# Patient Record
Sex: MALE | Race: BLACK OR AFRICAN AMERICAN | NOT HISPANIC OR LATINO | Employment: OTHER
[De-identification: names, ages, dates, MRNs, and addresses within clinical notes are randomized per-mention and may not be internally consistent; named-entity substitution may affect disease eponyms.]

---

## 2017-01-05 ENCOUNTER — CHARTING TRANS (OUTPATIENT)
Dept: OTHER | Age: 72
End: 2017-01-05

## 2017-02-15 ENCOUNTER — OFFICE VISIT (OUTPATIENT)
Dept: PODIATRY CLINIC | Facility: CLINIC | Age: 72
End: 2017-02-15

## 2017-02-15 DIAGNOSIS — B35.1 ONYCHOMYCOSIS: ICD-10-CM

## 2017-02-15 DIAGNOSIS — M79.674 PAIN OF TOE OF RIGHT FOOT: Primary | ICD-10-CM

## 2017-02-15 PROCEDURE — 11720 DEBRIDE NAIL 1-5: CPT | Performed by: PODIATRIST

## 2017-02-15 RX ORDER — A/SINGAPORE/GP1908/2015 IVR-180 (H1N1) (AN A/MICHIGAN/45/2015 (H1N1)PDM09-LIKE VIRUS), A/HONG KONG/4801/2014, NYMC X-263B (H3N2) (AN A/HONG KONG/4801/2014-LIKE VIRUS), AND B/BRISBANE/60/2008, WILD TYPE (A B/BRISBANE/60/2008-LIKE VIRUS) 15; 15; 15 UG/.5ML; UG/.5ML; UG/.5ML
INJECTION, SUSPENSION INTRAMUSCULAR
Refills: 0 | COMMUNITY
Start: 2016-12-07 | End: 2021-11-17 | Stop reason: ALTCHOICE

## 2017-02-15 NOTE — PROGRESS NOTES
HPI:    Patient ID: Sofy Cabrera is a 66year old male. HPI  This 58-year-old male present in associated with the nails of his right foot. He makes an effort but struggles to care for them sufficiently to provide relief.   Review of Systems  I did rev redevelop         ASSESSMENT/PLAN:   Pain of toe of right foot  (primary encounter diagnosis)  Onychomycosis    No orders of the defined types were placed in this encounter.        Meds This Visit:  No prescriptions requested or ordered in this encounter

## 2017-05-17 ENCOUNTER — OFFICE VISIT (OUTPATIENT)
Dept: PODIATRY CLINIC | Facility: CLINIC | Age: 72
End: 2017-05-17

## 2017-05-17 DIAGNOSIS — L84 FOOT CALLUS: ICD-10-CM

## 2017-05-17 DIAGNOSIS — M79.672 FOOT PAIN, LEFT: Primary | ICD-10-CM

## 2017-05-17 PROCEDURE — 99213 OFFICE O/P EST LOW 20 MIN: CPT | Performed by: PODIATRIST

## 2017-05-17 PROCEDURE — G0463 HOSPITAL OUTPT CLINIC VISIT: HCPCS | Performed by: PODIATRIST

## 2017-05-17 NOTE — PROGRESS NOTES
HPI:    Patient ID: Hao Hernandez is a 66year old male. HPI  This pleasant 75-year-old male presents to me with a new complaint. He developed left foot pain over the course of the last few weeks.   Pain that he is experiencing is with weightbearing an phalangeal joint. There is palpable discomfort on direct palpation. Sharply debridement demonstrates an area of nucleation. Upon complete debridement it is not open or draining.   I am not certain that there is a prominence of this metatarsal phalangeal

## 2017-07-31 ENCOUNTER — OFFICE VISIT (OUTPATIENT)
Dept: PODIATRY CLINIC | Facility: CLINIC | Age: 72
End: 2017-07-31

## 2017-07-31 DIAGNOSIS — M79.674 PAIN OF TOE OF RIGHT FOOT: Primary | ICD-10-CM

## 2017-07-31 DIAGNOSIS — B35.1 ONYCHOMYCOSIS: ICD-10-CM

## 2017-07-31 PROCEDURE — 11720 DEBRIDE NAIL 1-5: CPT | Performed by: PODIATRIST

## 2017-07-31 NOTE — PROGRESS NOTES
HPI:    Patient ID: Ceasar Kimble is a 66year old male. HPI  This 25-year-old male presents with recurrent pain associated with the nails of his right foot. 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   Patient has had previous ASSESSMENT/PLAN:   Pain of toe of right foot  (primary encounter diagnosis)  Onychomycosis    No orders of the defined types were placed in this encounter.       Meds This Visit:  No prescriptions requested or ordered in this encounter    Imaging & Referr

## 2017-09-27 ENCOUNTER — OFFICE VISIT (OUTPATIENT)
Dept: PODIATRY CLINIC | Facility: CLINIC | Age: 72
End: 2017-09-27

## 2017-09-27 DIAGNOSIS — M79.675 TOE PAIN, LEFT: ICD-10-CM

## 2017-09-27 DIAGNOSIS — M79.674 PAIN OF TOE OF RIGHT FOOT: Primary | ICD-10-CM

## 2017-09-27 DIAGNOSIS — B35.1 DERMATOPHYTOSIS OF NAIL: ICD-10-CM

## 2017-09-27 PROCEDURE — 11721 DEBRIDE NAIL 6 OR MORE: CPT | Performed by: PODIATRIST

## 2017-09-27 NOTE — PROGRESS NOTES
HPI:    Patient ID: Dank Garcia is a 66year old male. HPI  This 49-year-old male presents with recurrent pain associated with his nails. He is aware of thickness dystrophy and change consistent with long-standing chronic mycosis.   Patient's had pre symptoms redevelop         ASSESSMENT/PLAN:   Pain of toe of right foot  (primary encounter diagnosis)  Toe pain, left  Dermatophytosis of nail    No orders of the defined types were placed in this encounter.       Meds This Visit:  No prescriptions request

## 2017-11-08 ENCOUNTER — OFFICE VISIT (OUTPATIENT)
Dept: PODIATRY CLINIC | Facility: CLINIC | Age: 72
End: 2017-11-08

## 2017-11-08 DIAGNOSIS — B35.1 ONYCHOMYCOSIS: ICD-10-CM

## 2017-11-08 DIAGNOSIS — M79.674 PAIN OF TOE OF RIGHT FOOT: Primary | ICD-10-CM

## 2017-11-08 DIAGNOSIS — M79.675 TOE PAIN, LEFT: ICD-10-CM

## 2017-11-08 PROCEDURE — 11721 DEBRIDE NAIL 6 OR MORE: CPT | Performed by: PODIATRIST

## 2017-11-08 NOTE — PROGRESS NOTES
HPI:    Patient ID: Louann Valverde is a 74year old male. HPI  This 58-year-old male presents with pain associated with his nails. Patient has report of relief of pain on periodic debridement.   Review of Systems  I did review medical status, medication diagnosis)  Toe pain, left  Onychomycosis    No orders of the defined types were placed in this encounter.       Meds This Visit:  No prescriptions requested or ordered in this encounter    Imaging & Referrals:  None       [id number]

## 2017-12-20 ENCOUNTER — OFFICE VISIT (OUTPATIENT)
Dept: PODIATRY CLINIC | Facility: CLINIC | Age: 72
End: 2017-12-20

## 2017-12-20 DIAGNOSIS — B35.1 ONYCHOMYCOSIS: ICD-10-CM

## 2017-12-20 DIAGNOSIS — M79.674 PAIN OF TOE OF RIGHT FOOT: ICD-10-CM

## 2017-12-20 DIAGNOSIS — M79.675 TOE PAIN, LEFT: Primary | ICD-10-CM

## 2017-12-20 PROCEDURE — 11721 DEBRIDE NAIL 6 OR MORE: CPT | Performed by: PODIATRIST

## 2017-12-20 NOTE — PROGRESS NOTES
HPI:    Patient ID: Jose Engle is a 74year old male. HPI  26-year-old male presents with continued frustration and pain associated with his nails. He reports relief by periodic debridement.   Review of Systems  I reviewed medical status medications foot  Onychomycosis    No orders of the defined types were placed in this encounter.       Meds This Visit:  No prescriptions requested or ordered in this encounter    Imaging & Referrals:  None       [id number]

## 2017-12-27 ENCOUNTER — CHARTING TRANS (OUTPATIENT)
Dept: OTHER | Age: 72
End: 2017-12-27

## 2017-12-27 ENCOUNTER — LAB SERVICES (OUTPATIENT)
Dept: OTHER | Age: 72
End: 2017-12-27

## 2017-12-27 LAB
ALBUMIN SERPL-MCNC: 4.2 G/DL (ref 3.6–5.1)
ALBUMIN/GLOB SERPL: 1.3 (ref 1–2.4)
ALP SERPL-CCNC: 64 UNITS/L (ref 45–117)
ALT SERPL-CCNC: 30 UNITS/L
ANION GAP SERPL CALC-SCNC: 16 MMOL/L (ref 10–20)
APPEARANCE UR: CLEAR
AST SERPL-CCNC: 18 UNITS/L
BASOPHILS # BLD: 0 K/MCL (ref 0–0.3)
BASOPHILS NFR BLD: 1 %
BILIRUB SERPL-MCNC: 0.6 MG/DL (ref 0.2–1)
BILIRUB UR QL: NEGATIVE
BUN SERPL-MCNC: 16 MG/DL (ref 6–20)
BUN/CREAT SERPL: 13 (ref 7–25)
CALCIUM SERPL-MCNC: 9.4 MG/DL (ref 8.4–10.2)
CHLORIDE SERPL-SCNC: 102 MMOL/L (ref 98–107)
CHOLEST SERPL-MCNC: 162 MG/DL
CHOLEST/HDLC SERPL: 2.8
CO2 SERPL-SCNC: 29 MMOL/L (ref 21–32)
COLOR UR: YELLOW
CREAT SERPL-MCNC: 1.21 MG/DL (ref 0.67–1.17)
DIFFERENTIAL METHOD BLD: ABNORMAL
EOSINOPHIL # BLD: 0 K/MCL (ref 0.1–0.5)
EOSINOPHIL NFR BLD: 1 %
ERYTHROCYTE [DISTWIDTH] IN BLOOD: 13.3 % (ref 11–15)
GLOBULIN SER-MCNC: 3.3 G/DL (ref 2–4)
GLUCOSE SERPL-MCNC: 99 MG/DL (ref 65–99)
GLUCOSE UR-MCNC: NEGATIVE MG/DL
HDLC SERPL-MCNC: 58 MG/DL
HEMATOCRIT: 47.8 % (ref 39–51)
HEMOGLOBIN: 15.9 G/DL (ref 13–17)
KETONES UR-MCNC: NEGATIVE MG/DL
LDLC SERPL CALC-MCNC: 75 MG/DL
LENGTH OF FAST TIME PATIENT: 12 HRS
LENGTH OF FAST TIME PATIENT: 12 HRS
LYMPHOCYTES # BLD: 1.6 K/MCL (ref 1–4)
LYMPHOCYTES NFR BLD: 41 %
MEAN CORPUSCULAR HEMOGLOBIN: 29.5 PG (ref 26–34)
MEAN CORPUSCULAR HGB CONC: 33.3 G/DL (ref 32–36.5)
MEAN CORPUSCULAR VOLUME: 88.7 FL (ref 78–100)
MONOCYTES # BLD: 0.4 K/MCL (ref 0.3–0.9)
MONOCYTES NFR BLD: 10 %
NEUTROPHILS # BLD: 1.9 K/MCL (ref 1.8–7.7)
NEUTROPHILS NFR BLD: 47 %
NITRITE UR QL: NEGATIVE
NONHDLC SERPL-MCNC: 104 MG/DL
PH UR: 5 UNITS (ref 5–7)
PLATELET COUNT: 207 K/MCL (ref 140–450)
POTASSIUM SERPL-SCNC: 4.4 MMOL/L (ref 3.4–5.1)
PROT UR QL: NEGATIVE MG/DL
PSA SERPL-MCNC: 0.76 NG/ML
RBC-URINE: NEGATIVE
RED CELL COUNT: 5.39 MIL/MCL (ref 4.5–5.9)
SODIUM SERPL-SCNC: 143 MMOL/L (ref 135–145)
SP GR UR: 1.02 (ref 1–1.03)
SPECIMEN SOURCE: NORMAL
TOTAL PROTEIN: 7.5 G/DL (ref 6.4–8.2)
TRIGL SERPL-MCNC: 144 MG/DL
TSH SERPL-ACNC: 0.94 MCUNITS/ML (ref 0.35–5)
UROBILINOGEN UR QL: 0.2 MG/DL (ref 0–1)
WBC-URINE: NEGATIVE
WHITE BLOOD COUNT: 3.9 K/MCL (ref 4.2–11)

## 2018-01-31 ENCOUNTER — OFFICE VISIT (OUTPATIENT)
Dept: PODIATRY CLINIC | Facility: CLINIC | Age: 73
End: 2018-01-31

## 2018-01-31 DIAGNOSIS — B35.1 ONYCHOMYCOSIS: ICD-10-CM

## 2018-01-31 DIAGNOSIS — M79.675 TOE PAIN, LEFT: Primary | ICD-10-CM

## 2018-01-31 PROCEDURE — 11720 DEBRIDE NAIL 1-5: CPT | Performed by: PODIATRIST

## 2018-01-31 NOTE — PROGRESS NOTES
HPI:    Patient ID: Mark Rivera is a 74year old male. HPI  This 38-year-old male presents with recurrent pain in his chief concern is the nails of the right foot.   I trimmed them previously patient is very aware of the progressive nature of this myc (primary encounter diagnosis)  Onychomycosis    No orders of the defined types were placed in this encounter.       Meds This Visit:  No prescriptions requested or ordered in this encounter    Imaging & Referrals:  None       #[id number]

## 2018-02-06 ENCOUNTER — CHARTING TRANS (OUTPATIENT)
Dept: OTHER | Age: 73
End: 2018-02-06

## 2018-02-15 ENCOUNTER — CHARTING TRANS (OUTPATIENT)
Dept: OTHER | Age: 73
End: 2018-02-15

## 2018-02-15 ASSESSMENT — PAIN SCALES - GENERAL: PAINLEVEL_OUTOF10: 0

## 2018-03-21 ENCOUNTER — OFFICE VISIT (OUTPATIENT)
Dept: PODIATRY CLINIC | Facility: CLINIC | Age: 73
End: 2018-03-21

## 2018-03-21 DIAGNOSIS — M79.674 PAIN OF TOE OF RIGHT FOOT: Primary | ICD-10-CM

## 2018-03-21 DIAGNOSIS — M79.675 TOE PAIN, LEFT: ICD-10-CM

## 2018-03-21 DIAGNOSIS — B35.1 ONYCHOMYCOSIS: ICD-10-CM

## 2018-03-21 PROCEDURE — 11721 DEBRIDE NAIL 6 OR MORE: CPT | Performed by: PODIATRIST

## 2018-03-21 NOTE — PROGRESS NOTES
HPI:    Patient ID: Saint Blanch is a 74year old male. HPI  77-year-old male presents with recurrent pain associated with his nails. He has had relief by previous debridement. It remains his best and present option of care.   Review of Systems Referrals:  None       #[id number]

## 2018-05-09 ENCOUNTER — OFFICE VISIT (OUTPATIENT)
Dept: PODIATRY CLINIC | Facility: CLINIC | Age: 73
End: 2018-05-09

## 2018-05-09 DIAGNOSIS — M79.674 PAIN OF TOE OF RIGHT FOOT: Primary | ICD-10-CM

## 2018-05-09 DIAGNOSIS — B35.1 ONYCHOMYCOSIS: ICD-10-CM

## 2018-05-09 DIAGNOSIS — M79.675 TOE PAIN, LEFT: ICD-10-CM

## 2018-05-09 PROCEDURE — 11720 DEBRIDE NAIL 1-5: CPT | Performed by: PODIATRIST

## 2018-05-09 NOTE — PROGRESS NOTES
HPI:    Patient ID: Luis Mcarthur is a 74year old male. HPI  This 75-year-old male presents with recurrent pain associated with a number of his toenails. He has had relief by previous care.   Review of Systems  I did review medical status, medications encounter.       Meds This Visit:  No prescriptions requested or ordered in this encounter    Imaging & Referrals:  None       [id number]

## 2018-08-01 ENCOUNTER — OFFICE VISIT (OUTPATIENT)
Dept: PODIATRY CLINIC | Facility: CLINIC | Age: 73
End: 2018-08-01
Payer: MEDICARE

## 2018-08-01 DIAGNOSIS — M79.674 PAIN OF TOE OF RIGHT FOOT: Primary | ICD-10-CM

## 2018-08-01 DIAGNOSIS — B35.1 ONYCHOMYCOSIS: ICD-10-CM

## 2018-08-01 DIAGNOSIS — M79.675 TOE PAIN, LEFT: ICD-10-CM

## 2018-08-01 PROCEDURE — 11721 DEBRIDE NAIL 6 OR MORE: CPT | Performed by: PODIATRIST

## 2018-08-01 NOTE — PROGRESS NOTES
HPI:    Patient ID: Kimberley Dalton is a 74year old male. HPI  55-year-old male presents with recurrent pain associated with his nails. He reports relief from previous care.   Review of Systems  Did review medical status, medications and allergies were redevelop         ASSESSMENT/PLAN:   Pain of toe of right foot  (primary encounter diagnosis)  Toe pain, left  Onychomycosis    No orders of the defined types were placed in this encounter.       Meds This Visit:  No prescriptions requested or ordered in th

## 2018-08-17 ENCOUNTER — LAB SERVICES (OUTPATIENT)
Dept: OTHER | Age: 73
End: 2018-08-17

## 2018-08-17 ENCOUNTER — CHARTING TRANS (OUTPATIENT)
Dept: OTHER | Age: 73
End: 2018-08-17

## 2018-08-17 ASSESSMENT — PAIN SCALES - GENERAL: PAINLEVEL_OUTOF10: 0

## 2018-08-18 ENCOUNTER — CHARTING TRANS (OUTPATIENT)
Dept: OTHER | Age: 73
End: 2018-08-18

## 2018-08-18 LAB
ANION GAP SERPL CALC-SCNC: 14 MMOL/L (ref 10–20)
BASOPHILS # BLD: 0 K/MCL (ref 0–0.3)
BASOPHILS NFR BLD: 1 %
BUN SERPL-MCNC: 15 MG/DL (ref 6–20)
BUN/CREAT SERPL: 14 (ref 7–25)
CALCIUM SERPL-MCNC: 9.7 MG/DL (ref 8.4–10.2)
CHLORIDE SERPL-SCNC: 104 MMOL/L (ref 98–107)
CO2 SERPL-SCNC: 27 MMOL/L (ref 21–32)
CREAT SERPL-MCNC: 1.06 MG/DL (ref 0.67–1.17)
DIFFERENTIAL METHOD BLD: ABNORMAL
EOSINOPHIL # BLD: 0 K/MCL (ref 0.1–0.5)
EOSINOPHIL NFR BLD: 1 %
ERYTHROCYTE [DISTWIDTH] IN BLOOD: 13.3 % (ref 11–15)
GLUCOSE SERPL-MCNC: 93 MG/DL (ref 65–99)
HEMATOCRIT: 46.3 % (ref 39–51)
HEMOGLOBIN: 14.9 G/DL (ref 13–17)
IMM GRANULOCYTES # BLD AUTO: 0 K/MCL (ref 0–0.2)
IMM GRANULOCYTES NFR BLD: 0 %
LENGTH OF FAST TIME PATIENT: 12 HRS
LYMPHOCYTES # BLD: 1.2 K/MCL (ref 1–4)
LYMPHOCYTES NFR BLD: 35 %
MEAN CORPUSCULAR HEMOGLOBIN: 28.5 PG (ref 26–34)
MEAN CORPUSCULAR HGB CONC: 32.2 G/DL (ref 32–36.5)
MEAN CORPUSCULAR VOLUME: 88.7 FL (ref 78–100)
MONOCYTES # BLD: 0.5 K/MCL (ref 0.3–0.9)
MONOCYTES NFR BLD: 14 %
NEUTROPHILS # BLD: 1.7 K/MCL (ref 1.8–7.7)
NEUTROPHILS NFR BLD: 49 %
NRBC (NRBCRE): 0 /100 WBC
PLATELET COUNT: 210 K/MCL (ref 140–450)
POTASSIUM SERPL-SCNC: 4.6 MMOL/L (ref 3.4–5.1)
RED CELL COUNT: 5.22 MIL/MCL (ref 4.5–5.9)
SODIUM SERPL-SCNC: 140 MMOL/L (ref 135–145)
WHITE BLOOD COUNT: 3.4 K/MCL (ref 4.2–11)

## 2018-09-12 ENCOUNTER — OFFICE VISIT (OUTPATIENT)
Dept: PODIATRY CLINIC | Facility: CLINIC | Age: 73
End: 2018-09-12
Payer: MEDICARE

## 2018-09-12 VITALS — BODY MASS INDEX: 26.11 KG/M2 | WEIGHT: 210 LBS | HEIGHT: 75 IN

## 2018-09-12 DIAGNOSIS — M79.674 PAIN OF TOE OF RIGHT FOOT: Primary | ICD-10-CM

## 2018-09-12 DIAGNOSIS — M79.675 TOE PAIN, LEFT: ICD-10-CM

## 2018-09-12 DIAGNOSIS — B35.1 ONYCHOMYCOSIS: ICD-10-CM

## 2018-09-12 PROCEDURE — 11721 DEBRIDE NAIL 6 OR MORE: CPT | Performed by: PODIATRIST

## 2018-09-12 NOTE — PROGRESS NOTES
HPI:    Patient ID: Jose Engle is a 74year old male. HPI  This pleasant 41-year-old male presents with recurrent pain associated with his nails. He reports relief by previous care.   Review of Systems  I did review medical status, medications were encounter.       Meds This Visit:  Requested Prescriptions      No prescriptions requested or ordered in this encounter       Imaging & Referrals:  None       [id number]

## 2018-10-24 ENCOUNTER — OFFICE VISIT (OUTPATIENT)
Dept: PODIATRY CLINIC | Facility: CLINIC | Age: 73
End: 2018-10-24
Payer: MEDICARE

## 2018-10-24 DIAGNOSIS — B35.1 ONYCHOMYCOSIS: ICD-10-CM

## 2018-10-24 DIAGNOSIS — M79.674 PAIN OF TOE OF RIGHT FOOT: Primary | ICD-10-CM

## 2018-10-24 PROCEDURE — 11720 DEBRIDE NAIL 1-5: CPT | Performed by: PODIATRIST

## 2018-10-24 RX ORDER — FEXOFENADINE HYDROCHLORIDE 60 MG/1
TABLET, FILM COATED ORAL
Refills: 3 | COMMUNITY
Start: 2018-10-16 | End: 2020-08-12

## 2018-10-24 NOTE — PROGRESS NOTES
HPI:    Patient ID: Dank Garcia is a 74year old male. 70-year-old male presents with recurrent pain associated with the nails particular of his right foot. He is aware of the progressive increasing changes associated with chronic mycosis.   Trudi West or draining was noted.   I anticipate relief will see patient for care if and when symptoms redevelop           ASSESSMENT/PLAN:   Pain of toe of right foot  (primary encounter diagnosis)  Onychomycosis    No orders of the defined types were placed in this

## 2018-11-01 VITALS
HEIGHT: 75 IN | BODY MASS INDEX: 26.73 KG/M2 | TEMPERATURE: 98 F | DIASTOLIC BLOOD PRESSURE: 82 MMHG | SYSTOLIC BLOOD PRESSURE: 160 MMHG | WEIGHT: 215 LBS | OXYGEN SATURATION: 94 % | HEART RATE: 65 BPM | RESPIRATION RATE: 18 BRPM

## 2018-11-02 VITALS
HEART RATE: 87 BPM | HEIGHT: 75 IN | OXYGEN SATURATION: 97 % | TEMPERATURE: 98 F | RESPIRATION RATE: 16 BRPM | WEIGHT: 219 LBS | BODY MASS INDEX: 27.23 KG/M2 | DIASTOLIC BLOOD PRESSURE: 70 MMHG | SYSTOLIC BLOOD PRESSURE: 130 MMHG

## 2018-11-02 VITALS
WEIGHT: 215.5 LBS | TEMPERATURE: 97.9 F | HEART RATE: 72 BPM | HEIGHT: 75 IN | SYSTOLIC BLOOD PRESSURE: 140 MMHG | DIASTOLIC BLOOD PRESSURE: 74 MMHG | BODY MASS INDEX: 26.79 KG/M2 | RESPIRATION RATE: 16 BRPM

## 2018-11-27 VITALS
HEART RATE: 59 BPM | RESPIRATION RATE: 16 BRPM | SYSTOLIC BLOOD PRESSURE: 120 MMHG | TEMPERATURE: 97 F | DIASTOLIC BLOOD PRESSURE: 70 MMHG | HEIGHT: 75 IN | OXYGEN SATURATION: 98 % | WEIGHT: 213.38 LBS | BODY MASS INDEX: 26.53 KG/M2

## 2018-12-01 ENCOUNTER — PRIOR ORIGINAL RECORDS (OUTPATIENT)
Dept: HEALTH INFORMATION MANAGEMENT | Facility: OTHER | Age: 73
End: 2018-12-01

## 2018-12-19 ENCOUNTER — OFFICE VISIT (OUTPATIENT)
Dept: PODIATRY CLINIC | Facility: CLINIC | Age: 73
End: 2018-12-19
Payer: MEDICARE

## 2018-12-19 DIAGNOSIS — B35.1 ONYCHOMYCOSIS: ICD-10-CM

## 2018-12-19 DIAGNOSIS — M79.674 PAIN OF TOE OF RIGHT FOOT: Primary | ICD-10-CM

## 2018-12-19 PROCEDURE — 11720 DEBRIDE NAIL 1-5: CPT | Performed by: PODIATRIST

## 2018-12-19 NOTE — PROGRESS NOTES
HPI:    Patient ID: Zoey Chapman is a 74year old male. 15-year-old male presents complaining of pain associated with the nails of his right foot only. He states that he was able to care for the left one but he cannot do the right.   Clearly his nails a Runny nose   PHYSICAL EXAM:              ASSESSMENT/PLAN:   Pain of toe of right foot  (primary encounter diagnosis)  Onychomycosis    No orders of the defined types were placed in this encounter.       Meds This Visit:  Requested Prescriptions

## 2018-12-29 RX ORDER — TADALAFIL 5 MG/1
TABLET ORAL
Qty: 30 TABLET | Refills: 0 | Status: SHIPPED | OUTPATIENT
Start: 2018-12-29 | End: 2019-01-26 | Stop reason: SDUPTHER

## 2018-12-29 RX ORDER — FLUTICASONE PROPIONATE 50 MCG
SPRAY, SUSPENSION (ML) NASAL
Qty: 16 ML | Refills: 0 | Status: SHIPPED | OUTPATIENT
Start: 2018-12-29 | End: 2019-01-30 | Stop reason: SDUPTHER

## 2018-12-29 RX ORDER — TRIAMTERENE AND HYDROCHLOROTHIAZIDE 37.5; 25 MG/1; MG/1
TABLET ORAL
Qty: 90 TABLET | Refills: 0 | Status: SHIPPED | OUTPATIENT
Start: 2018-12-29 | End: 2019-03-26 | Stop reason: SDUPTHER

## 2019-01-11 ENCOUNTER — TELEPHONE (OUTPATIENT)
Dept: SCHEDULING | Age: 74
End: 2019-01-11

## 2019-01-14 ENCOUNTER — OFFICE VISIT (OUTPATIENT)
Dept: URGENT CARE | Age: 74
End: 2019-01-14

## 2019-01-14 VITALS
SYSTOLIC BLOOD PRESSURE: 162 MMHG | BODY MASS INDEX: 27.72 KG/M2 | OXYGEN SATURATION: 100 % | HEART RATE: 68 BPM | WEIGHT: 216 LBS | TEMPERATURE: 97.9 F | DIASTOLIC BLOOD PRESSURE: 79 MMHG | RESPIRATION RATE: 18 BRPM | HEIGHT: 74 IN

## 2019-01-14 DIAGNOSIS — I10 ESSENTIAL HYPERTENSION: ICD-10-CM

## 2019-01-14 DIAGNOSIS — J04.0 LARYNGITIS, ACUTE: Primary | ICD-10-CM

## 2019-01-14 PROCEDURE — 99203 OFFICE O/P NEW LOW 30 MIN: CPT | Performed by: INTERNAL MEDICINE

## 2019-01-14 RX ORDER — PREDNISONE 20 MG/1
40 TABLET ORAL DAILY
Qty: 10 TABLET | Refills: 0 | Status: SHIPPED | OUTPATIENT
Start: 2019-01-14 | End: 2019-10-02 | Stop reason: ALTCHOICE

## 2019-01-14 RX ORDER — AZITHROMYCIN 250 MG/1
TABLET, FILM COATED ORAL
Qty: 6 TABLET | Refills: 0 | Status: SHIPPED | OUTPATIENT
Start: 2019-01-14 | End: 2019-10-02 | Stop reason: ALTCHOICE

## 2019-01-20 ASSESSMENT — ENCOUNTER SYMPTOMS
EYES NEGATIVE: 1
CONSTITUTIONAL NEGATIVE: 1
ALLERGIC/IMMUNOLOGIC NEGATIVE: 1
PSYCHIATRIC NEGATIVE: 1
RESPIRATORY NEGATIVE: 1
GASTROINTESTINAL NEGATIVE: 1

## 2019-01-26 RX ORDER — TADALAFIL 5 MG/1
TABLET ORAL
Qty: 30 TABLET | Refills: 0 | Status: SHIPPED | OUTPATIENT
Start: 2019-01-26 | End: 2019-02-06 | Stop reason: SDUPTHER

## 2019-01-28 RX ORDER — MONTELUKAST SODIUM 10 MG/1
TABLET ORAL
COMMUNITY
Start: 2018-08-17 | End: 2021-02-11 | Stop reason: SDUPTHER

## 2019-01-28 RX ORDER — FEXOFENADINE HCL 60 MG/1
TABLET, FILM COATED ORAL
COMMUNITY
Start: 2018-08-17 | End: 2019-08-17

## 2019-01-30 DIAGNOSIS — J30.9 ALLERGIC RHINITIS, UNSPECIFIED SEASONALITY, UNSPECIFIED TRIGGER: Primary | ICD-10-CM

## 2019-01-31 RX ORDER — FLUTICASONE PROPIONATE 50 MCG
SPRAY, SUSPENSION (ML) NASAL
Qty: 16 ML | Refills: 1 | Status: SHIPPED | OUTPATIENT
Start: 2019-01-31 | End: 2019-03-26 | Stop reason: SDUPTHER

## 2019-02-06 ENCOUNTER — LAB SERVICES (OUTPATIENT)
Dept: LAB | Age: 74
End: 2019-02-06

## 2019-02-06 ENCOUNTER — OFFICE VISIT (OUTPATIENT)
Dept: FAMILY MEDICINE | Age: 74
End: 2019-02-06

## 2019-02-06 ENCOUNTER — HOSPITAL (OUTPATIENT)
Dept: OTHER | Age: 74
End: 2019-02-06

## 2019-02-06 ENCOUNTER — IMAGING SERVICES (OUTPATIENT)
Dept: GENERAL RADIOLOGY | Age: 74
End: 2019-02-06

## 2019-02-06 DIAGNOSIS — M54.2 NECK PAIN: ICD-10-CM

## 2019-02-06 DIAGNOSIS — M54.2 NECK PAIN: Primary | ICD-10-CM

## 2019-02-06 DIAGNOSIS — Z00.00 HEALTHCARE MAINTENANCE: ICD-10-CM

## 2019-02-06 LAB
ALBUMIN SERPL-MCNC: 3.8 G/DL (ref 3.6–5.1)
ALBUMIN/GLOB SERPL: 1.2 {RATIO} (ref 1–2.4)
ALP SERPL-CCNC: 62 UNITS/L (ref 45–117)
ALT SERPL-CCNC: 34 UNITS/L
ANION GAP SERPL CALC-SCNC: 12 MMOL/L (ref 10–20)
APPEARANCE UR: NORMAL
AST SERPL-CCNC: 18 UNITS/L
BASOPHILS # BLD AUTO: 0 K/MCL (ref 0–0.3)
BASOPHILS NFR BLD AUTO: 1 %
BILIRUB SERPL-MCNC: 0.5 MG/DL (ref 0.2–1)
BILIRUB UR QL STRIP: NEGATIVE
BUN SERPL-MCNC: 13 MG/DL (ref 6–20)
BUN/CREAT SERPL: 12 (ref 7–25)
CALCIUM SERPL-MCNC: 8.8 MG/DL (ref 8.4–10.2)
CHLORIDE SERPL-SCNC: 103 MMOL/L (ref 98–107)
CHOLEST SERPL-MCNC: 161 MG/DL
CHOLEST/HDLC SERPL: 3.2 {RATIO}
CO2 SERPL-SCNC: 30 MMOL/L (ref 21–32)
COLOR UR: YELLOW
CREAT SERPL-MCNC: 1.13 MG/DL (ref 0.67–1.17)
DIFFERENTIAL METHOD BLD: ABNORMAL
EOSINOPHIL # BLD AUTO: 0 K/MCL (ref 0.1–0.5)
EOSINOPHIL NFR SPEC: 1 %
ERYTHROCYTE [DISTWIDTH] IN BLOOD: 13 % (ref 11–15)
FASTING STATUS PATIENT QL REPORTED: 12 HRS
GLOBULIN SER-MCNC: 3.2 G/DL (ref 2–4)
GLUCOSE SERPL-MCNC: 94 MG/DL (ref 65–99)
GLUCOSE UR STRIP-MCNC: NEGATIVE MG/DL
HCT VFR BLD CALC: 44.3 % (ref 39–51)
HDLC SERPL-MCNC: 51 MG/DL
HGB BLD-MCNC: 14.7 G/DL (ref 13–17)
HGB UR QL STRIP: NEGATIVE
IMM GRANULOCYTES # BLD AUTO: 0 K/MCL (ref 0–0.2)
IMM GRANULOCYTES NFR BLD: 0 %
KETONES UR STRIP-MCNC: NEGATIVE MG/DL
LDLC SERPL-MCNC: 79 MG/DL
LENGTH OF FAST TIME PATIENT: 12 HRS
LEUKOCYTE ESTERASE UR QL STRIP: NEGATIVE
LYMPHOCYTES # BLD MANUAL: 1.3 K/MCL (ref 1–4)
LYMPHOCYTES NFR BLD MANUAL: 37 %
MCH RBC QN AUTO: 28.9 PG (ref 26–34)
MCHC RBC AUTO-ENTMCNC: 33.2 G/DL (ref 32–36.5)
MCV RBC AUTO: 87 FL (ref 78–100)
MONOCYTES # BLD MANUAL: 0.6 K/MCL (ref 0.3–0.9)
MONOCYTES NFR BLD MANUAL: 15 %
NEUTROPHILS # BLD: 1.7 K/MCL (ref 1.8–7.7)
NEUTROPHILS NFR BLD AUTO: 46 %
NITRITE UR QL STRIP: NEGATIVE
NONHDLC SERPL-MCNC: 110 MG/DL
NRBC BLD MANUAL-RTO: 0 /100 WBC
PH UR STRIP: 5 UNITS (ref 5–7)
PLATELET # BLD: 219 K/MCL (ref 140–450)
POTASSIUM SERPL-SCNC: 4.3 MMOL/L (ref 3.4–5.1)
PROT SERPL-MCNC: 7 G/DL (ref 6.4–8.2)
PROT UR STRIP-MCNC: NEGATIVE MG/DL
PSA SERPL-MCNC: 0.86 NG/ML
RBC # BLD: 5.09 MIL/MCL (ref 4.5–5.9)
SODIUM SERPL-SCNC: 141 MMOL/L (ref 135–145)
SP GR UR STRIP: 1.02 (ref 1–1.03)
SPECIMEN SOURCE: NORMAL
TRIGL SERPL-MCNC: 157 MG/DL
TSH SERPL-ACNC: 0.8 MCUNITS/ML (ref 0.35–5)
UROBILINOGEN UR STRIP-MCNC: 0.2 MG/DL (ref 0–1)
WBC # BLD: 3.6 K/MCL (ref 4.2–11)

## 2019-02-06 PROCEDURE — 72040 X-RAY EXAM NECK SPINE 2-3 VW: CPT | Performed by: EMERGENCY MEDICINE

## 2019-02-06 PROCEDURE — 99214 OFFICE O/P EST MOD 30 MIN: CPT | Performed by: FAMILY MEDICINE

## 2019-02-06 RX ORDER — TADALAFIL 5 MG/1
5 TABLET ORAL DAILY
Qty: 30 TABLET | Refills: 3 | Status: SHIPPED | OUTPATIENT
Start: 2019-02-06 | End: 2019-08-06 | Stop reason: SDUPTHER

## 2019-02-06 SDOH — HEALTH STABILITY: MENTAL HEALTH: HOW OFTEN DO YOU HAVE A DRINK CONTAINING ALCOHOL?: NEVER

## 2019-02-06 ASSESSMENT — ENCOUNTER SYMPTOMS
NEUROLOGICAL NEGATIVE: 1
EYES NEGATIVE: 1
PSYCHIATRIC NEGATIVE: 1
ALLERGIC/IMMUNOLOGIC NEGATIVE: 1
CONSTITUTIONAL NEGATIVE: 1
GASTROINTESTINAL NEGATIVE: 1
RESPIRATORY NEGATIVE: 1
ENDOCRINE NEGATIVE: 1

## 2019-02-06 ASSESSMENT — PAIN SCALES - GENERAL: PAINLEVEL: 7-8

## 2019-02-07 ENCOUNTER — TELEPHONE (OUTPATIENT)
Dept: SCHEDULING | Age: 74
End: 2019-02-07

## 2019-02-07 DIAGNOSIS — M54.2 NECK PAIN: Primary | ICD-10-CM

## 2019-02-08 DIAGNOSIS — M54.2 NECK PAIN: Primary | ICD-10-CM

## 2019-02-19 ENCOUNTER — OFFICE VISIT (OUTPATIENT)
Dept: PODIATRY CLINIC | Facility: CLINIC | Age: 74
End: 2019-02-19
Payer: MEDICARE

## 2019-02-19 DIAGNOSIS — B35.1 ONYCHOMYCOSIS: ICD-10-CM

## 2019-02-19 DIAGNOSIS — M79.674 PAIN OF TOE OF RIGHT FOOT: Primary | ICD-10-CM

## 2019-02-19 PROCEDURE — 11720 DEBRIDE NAIL 1-5: CPT | Performed by: PODIATRIST

## 2019-02-19 NOTE — PROGRESS NOTES
HPI:    Patient ID: Margarete Collet is a 74year old male. This 56-year-old male presents with recurrent pain associated with the nails of his right foot. He reports relief by previous treatment.       ROS:   I did review medical status, medications and nu encounter diagnosis)  Onychomycosis    No orders of the defined types were placed in this encounter.       Meds This Visit:  Requested Prescriptions      No prescriptions requested or ordered in this encounter       Imaging & Referrals:  None       #[id number]

## 2019-02-21 ENCOUNTER — HOSPITAL (OUTPATIENT)
Dept: OTHER | Age: 74
End: 2019-02-21
Attending: FAMILY MEDICINE

## 2019-03-11 ENCOUNTER — TELEPHONE (OUTPATIENT)
Dept: SCHEDULING | Age: 74
End: 2019-03-11

## 2019-03-13 ENCOUNTER — TELEPHONE (OUTPATIENT)
Dept: FAMILY MEDICINE | Age: 74
End: 2019-03-13

## 2019-03-25 ENCOUNTER — TELEPHONE (OUTPATIENT)
Dept: SCHEDULING | Age: 74
End: 2019-03-25

## 2019-03-26 DIAGNOSIS — J30.9 ALLERGIC RHINITIS, UNSPECIFIED SEASONALITY, UNSPECIFIED TRIGGER: ICD-10-CM

## 2019-03-26 RX ORDER — FLUTICASONE PROPIONATE 50 MCG
SPRAY, SUSPENSION (ML) NASAL
Qty: 16 ML | Refills: 0 | Status: SHIPPED | OUTPATIENT
Start: 2019-03-26 | End: 2020-02-10 | Stop reason: SDUPTHER

## 2019-03-26 RX ORDER — TRIAMTERENE AND HYDROCHLOROTHIAZIDE 37.5; 25 MG/1; MG/1
TABLET ORAL
Qty: 90 TABLET | Refills: 0 | Status: SHIPPED | OUTPATIENT
Start: 2019-03-26 | End: 2019-07-05 | Stop reason: SDUPTHER

## 2019-04-09 ENCOUNTER — OFFICE VISIT (OUTPATIENT)
Dept: PODIATRY CLINIC | Facility: CLINIC | Age: 74
End: 2019-04-09
Payer: MEDICARE

## 2019-04-09 DIAGNOSIS — B35.1 ONYCHOMYCOSIS: ICD-10-CM

## 2019-04-09 DIAGNOSIS — M79.674 PAIN OF TOE OF RIGHT FOOT: Primary | ICD-10-CM

## 2019-04-09 PROCEDURE — 11720 DEBRIDE NAIL 1-5: CPT | Performed by: PODIATRIST

## 2019-04-09 NOTE — PROGRESS NOTES
HPI:    Patient ID: Hao Hernandez is a 74year old male. 42-year-old male presents with recurrent pain associated with the nails of his right foot. He makes every effort but struggles to maintain and care for them on a consistent basis.       ROS:   JERRY meraz ASSESSMENT/PLAN:   Pain of toe of right foot  (primary encounter diagnosis)  Onychomycosis    No orders of the defined types were placed in this encounter.       Meds This Visit:  Requested Prescriptions      No prescriptions requested or ordered in this

## 2019-05-15 ENCOUNTER — OFFICE VISIT (OUTPATIENT)
Dept: PODIATRY CLINIC | Facility: CLINIC | Age: 74
End: 2019-05-15
Payer: MEDICARE

## 2019-05-15 DIAGNOSIS — B35.1 ONYCHOMYCOSIS: ICD-10-CM

## 2019-05-15 DIAGNOSIS — M79.674 PAIN OF TOE OF RIGHT FOOT: Primary | ICD-10-CM

## 2019-05-15 PROCEDURE — 11720 DEBRIDE NAIL 1-5: CPT | Performed by: PODIATRIST

## 2019-05-15 NOTE — PROGRESS NOTES
HPI:    Patient ID: Shabnam Godinez is a 74year old male. 70-year-old male presents with continued pain associated with the nails on his right foot. He reports relief by previous treatment.       ROS:     I did review medical status, medications were note and will see patient for care if and when symptoms redevelop           ASSESSMENT/PLAN:   Pain of toe of right foot  (primary encounter diagnosis)  Onychomycosis    No orders of the defined types were placed in this encounter.       Meds This Visit:  Reques

## 2019-06-26 ENCOUNTER — OFFICE VISIT (OUTPATIENT)
Dept: PODIATRY CLINIC | Facility: CLINIC | Age: 74
End: 2019-06-26
Payer: MEDICARE

## 2019-06-26 DIAGNOSIS — L84 FOOT CALLUS: Primary | ICD-10-CM

## 2019-06-26 NOTE — PROGRESS NOTES
HPI:    Patient ID: Sofy Cabrera is a 74year old male. 24-year-old male presents with some concerns associated with the ball of the right foot. States that the callus seems to become heavy and so a source of irritation.   Is not aware of swelling or re placed in this encounter.       Meds This Visit:  Requested Prescriptions      No prescriptions requested or ordered in this encounter       Imaging & Referrals:  None       #[id number]

## 2019-07-05 RX ORDER — TRIAMTERENE AND HYDROCHLOROTHIAZIDE 37.5; 25 MG/1; MG/1
TABLET ORAL
Qty: 90 TABLET | Refills: 0 | Status: SHIPPED | OUTPATIENT
Start: 2019-07-05 | End: 2019-09-26 | Stop reason: SDUPTHER

## 2019-08-07 ENCOUNTER — OFFICE VISIT (OUTPATIENT)
Dept: PODIATRY CLINIC | Facility: CLINIC | Age: 74
End: 2019-08-07
Payer: MEDICARE

## 2019-08-07 DIAGNOSIS — B35.1 ONYCHOMYCOSIS: ICD-10-CM

## 2019-08-07 DIAGNOSIS — M79.674 PAIN OF TOE OF RIGHT FOOT: Primary | ICD-10-CM

## 2019-08-07 PROCEDURE — 11720 DEBRIDE NAIL 1-5: CPT | Performed by: PODIATRIST

## 2019-08-07 NOTE — PROGRESS NOTES
HPI:    Patient ID: Mackenzie Mitchell is a 74year old male. 64-year-old male presents and asked that I care for his painful toenails on the right foot. This is a chronic and recurrent condition and he has had relief by previous care.     ROS:     I did revi without incident. I reduced nail, subungual debris, and some keratosis. No ulcerations or infections were noted.   I anticipate relief will see patient for care if and when symptoms redevelop         ASSESSMENT/PLAN:   Pain of toe of right foot  (primary

## 2019-08-08 RX ORDER — TADALAFIL 5 MG/1
5 TABLET ORAL DAILY
Qty: 30 TABLET | Refills: 0 | Status: SHIPPED | OUTPATIENT
Start: 2019-08-08 | End: 2019-09-06 | Stop reason: SDUPTHER

## 2019-09-06 RX ORDER — TADALAFIL 5 MG/1
5 TABLET ORAL DAILY
Qty: 30 TABLET | Refills: 0 | Status: SHIPPED | OUTPATIENT
Start: 2019-09-06 | End: 2020-02-10 | Stop reason: SDUPTHER

## 2019-09-06 RX ORDER — MONTELUKAST SODIUM 10 MG/1
TABLET ORAL
Qty: 90 TABLET | Refills: 0 | Status: SHIPPED | OUTPATIENT
Start: 2019-09-06 | End: 2020-02-10 | Stop reason: SDUPTHER

## 2019-09-18 ENCOUNTER — OFFICE VISIT (OUTPATIENT)
Dept: PODIATRY CLINIC | Facility: CLINIC | Age: 74
End: 2019-09-18
Payer: MEDICARE

## 2019-09-18 DIAGNOSIS — B35.1 ONYCHOMYCOSIS: ICD-10-CM

## 2019-09-18 DIAGNOSIS — M79.674 PAIN OF TOE OF RIGHT FOOT: Primary | ICD-10-CM

## 2019-09-18 PROCEDURE — 11720 DEBRIDE NAIL 1-5: CPT | Performed by: PODIATRIST

## 2019-09-27 RX ORDER — TRIAMTERENE AND HYDROCHLOROTHIAZIDE 37.5; 25 MG/1; MG/1
TABLET ORAL
Qty: 30 TABLET | Refills: 0 | Status: SHIPPED | OUTPATIENT
Start: 2019-09-27 | End: 2019-10-27 | Stop reason: SDUPTHER

## 2019-09-27 RX ORDER — TRIAMTERENE AND HYDROCHLOROTHIAZIDE 37.5; 25 MG/1; MG/1
TABLET ORAL
Qty: 90 TABLET | Refills: 0 | OUTPATIENT
Start: 2019-09-27

## 2019-10-02 ENCOUNTER — OFFICE VISIT (OUTPATIENT)
Dept: FAMILY MEDICINE | Age: 74
End: 2019-10-02

## 2019-10-02 VITALS
DIASTOLIC BLOOD PRESSURE: 70 MMHG | RESPIRATION RATE: 18 BRPM | BODY MASS INDEX: 26.24 KG/M2 | SYSTOLIC BLOOD PRESSURE: 140 MMHG | HEIGHT: 75 IN | OXYGEN SATURATION: 97 % | HEART RATE: 56 BPM | TEMPERATURE: 97 F | WEIGHT: 211 LBS

## 2019-10-02 DIAGNOSIS — Z00.00 HEALTHCARE MAINTENANCE: Primary | ICD-10-CM

## 2019-10-02 DIAGNOSIS — Z23 NEED FOR IMMUNIZATION AGAINST INFLUENZA: ICD-10-CM

## 2019-10-02 DIAGNOSIS — R43.2 LOSS OF TASTE: Primary | ICD-10-CM

## 2019-10-02 PROCEDURE — 90662 IIV NO PRSV INCREASED AG IM: CPT

## 2019-10-02 PROCEDURE — G0438 PPPS, INITIAL VISIT: HCPCS

## 2019-10-02 PROCEDURE — G0008 ADMIN INFLUENZA VIRUS VAC: HCPCS

## 2019-10-02 PROCEDURE — 99213 OFFICE O/P EST LOW 20 MIN: CPT | Performed by: FAMILY MEDICINE

## 2019-10-02 RX ORDER — FEXOFENADINE HCL 180 MG/1
TABLET ORAL
COMMUNITY
Start: 2019-08-06 | End: 2021-02-11 | Stop reason: SDUPTHER

## 2019-10-02 SDOH — HEALTH STABILITY: MENTAL HEALTH
STRESS IS WHEN SOMEONE FEELS TENSE, NERVOUS, ANXIOUS, OR CAN'T SLEEP AT NIGHT BECAUSE THEIR MIND IS TROUBLED. HOW STRESSED ARE YOU?: NOT AT ALL

## 2019-10-02 SDOH — SOCIAL STABILITY: SOCIAL INSECURITY
WITHIN THE LAST YEAR, HAVE TO BEEN RAPED OR FORCED TO HAVE ANY KIND OF SEXUAL ACTIVITY BY YOUR PARTNER OR EX-PARTNER?: NO

## 2019-10-02 SDOH — HEALTH STABILITY: PHYSICAL HEALTH: ON AVERAGE, HOW MANY DAYS PER WEEK DO YOU ENGAGE IN MODERATE TO STRENUOUS EXERCISE (LIKE A BRISK WALK)?: 3 DAYS

## 2019-10-02 SDOH — SOCIAL STABILITY: SOCIAL INSECURITY: WITHIN THE LAST YEAR, HAVE YOU BEEN AFRAID OF YOUR PARTNER OR EX-PARTNER?: NO

## 2019-10-02 SDOH — SOCIAL STABILITY: SOCIAL NETWORK: HOW OFTEN DO YOU GET TOGETHER WITH FRIENDS OR RELATIVES?: TWICE A WEEK

## 2019-10-02 SDOH — SOCIAL STABILITY: SOCIAL INSECURITY
WITHIN THE LAST YEAR, HAVE YOU BEEN KICKED, HIT, SLAPPED, OR OTHERWISE PHYSICALLY HURT BY YOUR PARTNER OR EX-PARTNER?: NO

## 2019-10-02 SDOH — SOCIAL STABILITY: SOCIAL NETWORK: IN A TYPICAL WEEK, HOW MANY TIMES DO YOU TALK ON THE PHONE WITH FAMILY, FRIENDS, OR NEIGHBORS?: ONCE A WEEK

## 2019-10-02 SDOH — HEALTH STABILITY: MENTAL HEALTH: HOW OFTEN DO YOU HAVE A DRINK CONTAINING ALCOHOL?: NEVER

## 2019-10-02 SDOH — SOCIAL STABILITY: SOCIAL INSECURITY: WITHIN THE LAST YEAR, HAVE YOU BEEN HUMILIATED OR EMOTIONALLY ABUSED IN OTHER WAYS BY YOUR PARTNER OR EX-PARTNER?: NO

## 2019-10-02 SDOH — HEALTH STABILITY: PHYSICAL HEALTH: ON AVERAGE, HOW MANY MINUTES DO YOU ENGAGE IN EXERCISE AT THIS LEVEL?: 120 MIN

## 2019-10-02 SDOH — SOCIAL STABILITY: SOCIAL NETWORK: ARE YOU MARRIED, WIDOWED, DIVORCED, SEPARATED, NEVER MARRIED, OR LIVING WITH A PARTNER?: MARRIED

## 2019-10-02 SDOH — ECONOMIC STABILITY: HOUSING INSECURITY: ARE YOU WORRIED ABOUT LOSING YOUR HOUSING?: NO

## 2019-10-02 SDOH — ECONOMIC STABILITY: HOUSING INSECURITY: WHAT IS YOUR LIVING SITUATION TODAY?: I HAVE HOUSING

## 2019-10-02 ASSESSMENT — MINI COG
PATIENT WAS GIVEN REPEAT BACK WORDS FROM VERSION: IMMEDIATE REPEAT BACK - APPLE WATCH PENNY
ABLE TO REPEAT THE 3 WORDS GIVEN PREVIOUSLY?: APPLE;WATCH;PENNY
PATIENT ABLE TO FILL IN THE CLOCK FACE WITH 10 MINUTES PAST 11 O'CLOCK?: NO, CLOCK IS NOT CORRECT

## 2019-10-02 ASSESSMENT — GERIATRIC DEPRESSION SCALE SHORT VERSION (GDS-SV)
ARE YOU IN GOOD SPIRITS MOST OF THE TIME: YES
DO YOU THINK IT IS WONDERFUL TO BE ALIVE NOW: YES
DO YOU FEEL YOU HAVE MORE PROBLEMS WITH MEMORY THAN MOST: NO
DO YOU OFTEN GET BORED: NO
ARE YOU AFRAID THAT SOMETHING BAD IS GOING TO HAPPEN TO YOU: NO
IS IT HARD FOR YOU TO GET STARTED ON NEW PROJECTS?: NO
GDS TOTAL SCORE: 0
DO YOU PREFER TO STAY AT HOME, RATHER THAN GOING OUT AND DOING NEW THINGS: NO
DO YOU FEEL PRETTY WORTHLESS THE WAY YOU ARE NOW: NO
DO YOU FEEL FULL OF ENERGY: YES
DO YOU OFTEN FEEL HELPLESS: NO
DO YOU FEEL THAT YOUR LIFE IS EMPTY: NO
DO YOU FEEL HAPPY MOST OF THE TIME: YES
DO YOU FIND LIFE VERY EXCITING: YES
HAVE YOU DROPPED MANY OF YOUR ACTIVITIES AND INTERESTS?: NO
ARE YOU BASICALLY SATISFIED WITH YOUR LIFE: YES

## 2019-10-02 ASSESSMENT — ANXIETY QUESTIONNAIRES
5. BEING SO RESTLESS THAT IT IS HARD TO SIT STILL: NOT AT ALL
4. TROUBLE RELAXING: NOT AT ALL
6. BECOMING EASILY ANNOYED OR IRRITABLE: 0
IF YOU CHECKED OFF ANY PROBLEMS ON THIS QUESTIONNAIRE, HOW DIFFICULT HAVE THESE PROBLEMS MADE IT FOR YOU TO DO YOUR WORK, TAKE CARE OF THINGS AT HOME, OR GET ALONG WITH OTHER PEOPLE: NOT DIFFICULT AT ALL
4. TROUBLE RELAXING: 0
3. WORRYING TOO MUCH ABOUT DIFFERENT THINGS: NOT AT ALL
3. WORRYING TOO MUCH ABOUT DIFFERENT THINGS: 0
1. FEELING NERVOUS, ANXIOUS, OR ON EDGE: NOT AT ALL
2. NOT BEING ABLE TO STOP OR CONTROL WORRYING: NOT AT ALL
GAD7 TOTAL SCORE: 0
2. NOT BEING ABLE TO STOP OR CONTROL WORRYING: 0
5. BEING SO RESTLESS THAT IT IS HARD TO SIT STILL: 0
7. FEELING AFRAID AS IF SOMETHING AWFUL MIGHT HAPPEN: NOT AT ALL
1. FEELING NERVOUS, ANXIOUS, OR ON EDGE: 0
7. FEELING AFRAID AS IF SOMETHING AWFUL MIGHT HAPPEN: 0
6. BECOMING EASILY ANNOYED OR IRRITABLE: NOT AT ALL

## 2019-10-02 ASSESSMENT — COGNITIVE AND FUNCTIONAL STATUS - GENERAL
BECAUSE OF A PHYSICAL, MENTAL, OR EMOTIONAL CONDITION, DO YOU HAVE SERIOUS DIFFICULTY CONCENTRATING, REMEMBERING OR MAKING DECISIONS: NO
DO YOU HAVE DIFFICULTY DRESSING OR BATHING: NO
BECAUSE OF A PHYSICAL, MENTAL, OR EMOTIONAL CONDITION, DO YOU HAVE DIFFICULTY DOING ERRANDS ALONE: NO
DO YOU HAVE SERIOUS DIFFICULTY WALKING OR CLIMBING STAIRS: NO
ARE YOU DEAF OR DO YOU HAVE SERIOUS DIFFICULTY  HEARING: NO
ARE YOU BLIND OR DO YOU HAVE SERIOUS DIFFICULTY SEEING, EVEN WHEN WEARING GLASSES: NO

## 2019-10-02 ASSESSMENT — PATIENT HEALTH QUESTIONNAIRE - PHQ9
SUM OF ALL RESPONSES TO PHQ9 QUESTIONS 1 AND 2: 0
SUM OF ALL RESPONSES TO PHQ9 QUESTIONS 1 AND 2: 0
1. LITTLE INTEREST OR PLEASURE IN DOING THINGS: NOT AT ALL
2. FEELING DOWN, DEPRESSED OR HOPELESS: NOT AT ALL

## 2019-10-02 ASSESSMENT — ENCOUNTER SYMPTOMS
RESPIRATORY NEGATIVE: 1
EYES NEGATIVE: 1
NEUROLOGICAL NEGATIVE: 1
CONSTITUTIONAL NEGATIVE: 1
GASTROINTESTINAL NEGATIVE: 1
ENDOCRINE NEGATIVE: 1
PSYCHIATRIC NEGATIVE: 1

## 2019-10-02 ASSESSMENT — ACTIVITIES OF DAILY LIVING (ADL)
ADL_BEFORE_ADMISSION: INDEPENDENT
ADL_SCORE: 12
NEEDS_ASSIST: NO
RECENT_DECLINE_ADL: NO
ADL_SHORT_OF_BREATH: NO

## 2019-10-02 ASSESSMENT — PAIN SCALES - GENERAL: PAINLEVEL: 0

## 2019-10-09 RX ORDER — TADALAFIL 5 MG/1
5 TABLET ORAL DAILY
Qty: 30 TABLET | Refills: 0 | Status: SHIPPED | OUTPATIENT
Start: 2019-10-09 | End: 2019-11-07 | Stop reason: SDUPTHER

## 2019-10-28 RX ORDER — TRIAMTERENE AND HYDROCHLOROTHIAZIDE 37.5; 25 MG/1; MG/1
TABLET ORAL
Qty: 30 TABLET | Refills: 0 | Status: SHIPPED | OUTPATIENT
Start: 2019-10-28 | End: 2020-02-10 | Stop reason: SDUPTHER

## 2019-11-04 ENCOUNTER — TELEPHONE (OUTPATIENT)
Dept: PODIATRY CLINIC | Facility: CLINIC | Age: 74
End: 2019-11-04

## 2019-11-04 NOTE — TELEPHONE ENCOUNTER
Pt had a conflict in his schedule and had to cancel his 11/6 appt. BONNY offered next available at [facility] but pt declined and is requesting to be seen sooner than 12/03.  Pt wants to speak with RN and states he cannot wait that long to be seen

## 2019-11-05 ENCOUNTER — OFFICE VISIT (OUTPATIENT)
Dept: PODIATRY CLINIC | Facility: CLINIC | Age: 74
End: 2019-11-05
Payer: MEDICARE

## 2019-11-05 DIAGNOSIS — M79.674 PAIN OF TOE OF RIGHT FOOT: Primary | ICD-10-CM

## 2019-11-05 DIAGNOSIS — B35.1 ONYCHOMYCOSIS: ICD-10-CM

## 2019-11-05 PROCEDURE — 11721 DEBRIDE NAIL 6 OR MORE: CPT | Performed by: PODIATRIST

## 2019-11-05 NOTE — PROGRESS NOTES
HPI:    Patient ID: Mellissa Donaldson is a 66year old male. 68-year-old male presents with recurrent pain associated with the nails of his right foot. They remain frustrating to care for and he struggles to manage them. He reports relief by previous care. see patient for care if and when symptoms redevelop           ASSESSMENT/PLAN:   Pain of toe of right foot  (primary encounter diagnosis)  Onychomycosis    No orders of the defined types were placed in this encounter.       Meds This Visit:  Requested Presc

## 2019-11-08 RX ORDER — TADALAFIL 5 MG/1
5 TABLET ORAL DAILY
Qty: 30 TABLET | Refills: 0 | Status: SHIPPED | OUTPATIENT
Start: 2019-11-08 | End: 2020-02-10 | Stop reason: SDUPTHER

## 2019-11-29 RX ORDER — TRIAMTERENE AND HYDROCHLOROTHIAZIDE 37.5; 25 MG/1; MG/1
TABLET ORAL
Qty: 30 TABLET | Refills: 0 | Status: SHIPPED | OUTPATIENT
Start: 2019-11-29 | End: 2020-02-10 | Stop reason: SDUPTHER

## 2019-12-18 ENCOUNTER — OFFICE VISIT (OUTPATIENT)
Dept: PODIATRY CLINIC | Facility: CLINIC | Age: 74
End: 2019-12-18
Payer: MEDICARE

## 2019-12-18 DIAGNOSIS — M79.674 PAIN OF TOE OF RIGHT FOOT: Primary | ICD-10-CM

## 2019-12-18 DIAGNOSIS — B35.1 ONYCHOMYCOSIS: ICD-10-CM

## 2019-12-18 PROCEDURE — 11720 DEBRIDE NAIL 1-5: CPT | Performed by: PODIATRIST

## 2019-12-18 NOTE — PROGRESS NOTES
HPI:    Patient ID: Tl Roca is a 66year old male. 75-year-old male presents with a chief concern associated with the nails of his right foot.   He states that he is not able to manage and care for them and they continue to progress and cause pain care if and when symptoms redevelop           ASSESSMENT/PLAN:   Pain of toe of right foot  (primary encounter diagnosis)  Onychomycosis    No orders of the defined types were placed in this encounter.       Meds This Visit:  Requested Prescriptions      No

## 2019-12-26 RX ORDER — TADALAFIL 5 MG/1
5 TABLET ORAL DAILY
Qty: 30 TABLET | Refills: 0 | Status: SHIPPED | OUTPATIENT
Start: 2019-12-26 | End: 2020-02-10 | Stop reason: SDUPTHER

## 2019-12-31 ENCOUNTER — TELEPHONE (OUTPATIENT)
Dept: SCHEDULING | Age: 74
End: 2019-12-31

## 2019-12-31 RX ORDER — TRIAMTERENE AND HYDROCHLOROTHIAZIDE 37.5; 25 MG/1; MG/1
TABLET ORAL
Qty: 30 TABLET | Refills: 0 | Status: SHIPPED | OUTPATIENT
Start: 2019-12-31 | End: 2020-02-10 | Stop reason: SDUPTHER

## 2020-01-28 ENCOUNTER — OFFICE VISIT (OUTPATIENT)
Dept: PODIATRY CLINIC | Facility: CLINIC | Age: 75
End: 2020-01-28
Payer: MEDICARE

## 2020-01-28 DIAGNOSIS — B35.1 ONYCHOMYCOSIS: ICD-10-CM

## 2020-01-28 DIAGNOSIS — M79.674 PAIN OF TOE OF RIGHT FOOT: Primary | ICD-10-CM

## 2020-01-28 NOTE — PROGRESS NOTES
HPI:    Patient ID: Shmuel Marie is a 66year old male. 41-year-old male presents with some discomfort associated with the fifth toe of the right foot.   Apparently I did not trim it sufficiently on the last visit to provide him with any longstanding rel

## 2020-02-01 RX ORDER — TADALAFIL 5 MG/1
5 TABLET ORAL DAILY
Qty: 30 TABLET | Refills: 0 | Status: SHIPPED | OUTPATIENT
Start: 2020-02-01 | End: 2020-02-10 | Stop reason: SDUPTHER

## 2020-02-01 RX ORDER — TRIAMTERENE AND HYDROCHLOROTHIAZIDE 37.5; 25 MG/1; MG/1
TABLET ORAL
Qty: 30 TABLET | Refills: 0 | Status: SHIPPED | OUTPATIENT
Start: 2020-02-01 | End: 2020-02-10 | Stop reason: SDUPTHER

## 2020-02-10 ENCOUNTER — LAB SERVICES (OUTPATIENT)
Dept: LAB | Age: 75
End: 2020-02-10

## 2020-02-10 ENCOUNTER — OFFICE VISIT (OUTPATIENT)
Dept: FAMILY MEDICINE | Age: 75
End: 2020-02-10

## 2020-02-10 VITALS
RESPIRATION RATE: 18 BRPM | BODY MASS INDEX: 26.91 KG/M2 | HEIGHT: 75 IN | HEART RATE: 50 BPM | SYSTOLIC BLOOD PRESSURE: 160 MMHG | WEIGHT: 216.4 LBS | OXYGEN SATURATION: 96 % | DIASTOLIC BLOOD PRESSURE: 70 MMHG

## 2020-02-10 DIAGNOSIS — I10 ESSENTIAL HYPERTENSION: ICD-10-CM

## 2020-02-10 DIAGNOSIS — N40.1 BENIGN PROSTATIC HYPERPLASIA WITH LOWER URINARY TRACT SYMPTOMS, SYMPTOM DETAILS UNSPECIFIED: ICD-10-CM

## 2020-02-10 DIAGNOSIS — N52.9 ERECTILE DYSFUNCTION, UNSPECIFIED ERECTILE DYSFUNCTION TYPE: ICD-10-CM

## 2020-02-10 DIAGNOSIS — J30.9 ALLERGIC RHINITIS, UNSPECIFIED SEASONALITY, UNSPECIFIED TRIGGER: Primary | ICD-10-CM

## 2020-02-10 DIAGNOSIS — Z00.00 PHYSICAL EXAM, ROUTINE: ICD-10-CM

## 2020-02-10 LAB
ALBUMIN SERPL-MCNC: 4.1 G/DL (ref 3.6–5.1)
ALBUMIN/GLOB SERPL: 1.2 {RATIO} (ref 1–2.4)
ALP SERPL-CCNC: 71 UNITS/L (ref 45–117)
ALT SERPL-CCNC: 52 UNITS/L
ANION GAP SERPL CALC-SCNC: 7 MMOL/L (ref 10–20)
APPEARANCE UR: CLEAR
AST SERPL-CCNC: 20 UNITS/L
BILIRUB SERPL-MCNC: 0.8 MG/DL (ref 0.2–1)
BILIRUB UR QL STRIP: NEGATIVE
BUN SERPL-MCNC: 18 MG/DL (ref 6–20)
BUN/CREAT SERPL: 17 (ref 7–25)
CALCIUM SERPL-MCNC: 9.3 MG/DL (ref 8.4–10.2)
CHLORIDE SERPL-SCNC: 103 MMOL/L (ref 98–107)
CHOLEST SERPL-MCNC: 172 MG/DL
CHOLEST/HDLC SERPL: 3 {RATIO}
CO2 SERPL-SCNC: 33 MMOL/L (ref 21–32)
COLOR UR: YELLOW
CREAT SERPL-MCNC: 1.06 MG/DL (ref 0.67–1.17)
ERYTHROCYTE [DISTWIDTH] IN BLOOD: 13.2 % (ref 11–15)
FASTING STATUS PATIENT QL REPORTED: 12 HRS
GLOBULIN SER-MCNC: 3.4 G/DL (ref 2–4)
GLUCOSE SERPL-MCNC: 93 MG/DL (ref 65–99)
GLUCOSE UR STRIP-MCNC: NEGATIVE MG/DL
HBA1C MFR BLD: 5.7 % (ref 4.5–5.6)
HCT VFR BLD CALC: 47.7 % (ref 39–51)
HDLC SERPL-MCNC: 57 MG/DL
HGB BLD-MCNC: 15.7 G/DL (ref 13–17)
HGB UR QL STRIP: NEGATIVE
KETONES UR STRIP-MCNC: NEGATIVE MG/DL
LDLC SERPL-MCNC: 89 MG/DL
LENGTH OF FAST TIME PATIENT: 12 HRS
LEUKOCYTE ESTERASE UR QL STRIP: NEGATIVE
MCH RBC QN AUTO: 29.2 PG (ref 26–34)
MCHC RBC AUTO-ENTMCNC: 32.9 G/DL (ref 32–36.5)
MCV RBC AUTO: 88.7 FL (ref 78–100)
NITRITE UR QL STRIP: NEGATIVE
NONHDLC SERPL-MCNC: 115 MG/DL
NRBC BLD MANUAL-RTO: 0 /100 WBC
PH UR STRIP: 5 UNITS (ref 5–7)
PLATELET # BLD: 242 K/MCL (ref 140–450)
POTASSIUM SERPL-SCNC: 4.6 MMOL/L (ref 3.4–5.1)
PROT SERPL-MCNC: 7.5 G/DL (ref 6.4–8.2)
PROT UR STRIP-MCNC: NEGATIVE MG/DL
RBC # BLD: 5.38 MIL/MCL (ref 4.5–5.9)
SODIUM SERPL-SCNC: 138 MMOL/L (ref 135–145)
SP GR UR STRIP: 1.02 (ref 1–1.03)
SPECIMEN SOURCE: NORMAL
TRIGL SERPL-MCNC: 130 MG/DL
TSH SERPL-ACNC: 0.86 MCUNITS/ML (ref 0.35–5)
UROBILINOGEN UR STRIP-MCNC: 0.2 MG/DL (ref 0–1)
WBC # BLD: 4.8 K/MCL (ref 4.2–11)

## 2020-02-10 PROCEDURE — 99214 OFFICE O/P EST MOD 30 MIN: CPT | Performed by: FAMILY MEDICINE

## 2020-02-10 RX ORDER — FLUTICASONE PROPIONATE 50 MCG
2 SPRAY, SUSPENSION (ML) NASAL DAILY
Qty: 16 ML | Refills: 2 | Status: SHIPPED | OUTPATIENT
Start: 2020-02-10 | End: 2020-06-16

## 2020-02-10 RX ORDER — TRIAMTERENE AND HYDROCHLOROTHIAZIDE 37.5; 25 MG/1; MG/1
1 TABLET ORAL DAILY
Qty: 90 TABLET | Refills: 2 | Status: SHIPPED | OUTPATIENT
Start: 2020-02-10 | End: 2020-12-09

## 2020-02-10 RX ORDER — MONTELUKAST SODIUM 10 MG/1
10 TABLET ORAL DAILY
Qty: 90 TABLET | Refills: 2 | Status: SHIPPED | OUTPATIENT
Start: 2020-02-10 | End: 2020-02-10 | Stop reason: SDUPTHER

## 2020-02-10 RX ORDER — TADALAFIL 5 MG/1
5 TABLET ORAL DAILY
Qty: 30 TABLET | Refills: 0 | Status: SHIPPED | OUTPATIENT
Start: 2020-02-10 | End: 2020-02-10 | Stop reason: SDUPTHER

## 2020-02-10 RX ORDER — FLUTICASONE PROPIONATE 50 MCG
2 SPRAY, SUSPENSION (ML) NASAL DAILY
Qty: 16 ML | Refills: 2 | Status: SHIPPED | OUTPATIENT
Start: 2020-02-10 | End: 2020-02-10 | Stop reason: SDUPTHER

## 2020-02-10 RX ORDER — MONTELUKAST SODIUM 10 MG/1
10 TABLET ORAL DAILY
Qty: 90 TABLET | Refills: 2 | Status: SHIPPED | OUTPATIENT
Start: 2020-02-10 | End: 2021-05-09 | Stop reason: ALTCHOICE

## 2020-02-10 RX ORDER — TADALAFIL 5 MG/1
5 TABLET ORAL DAILY
Qty: 30 TABLET | Refills: 0 | Status: SHIPPED | OUTPATIENT
Start: 2020-02-10 | End: 2020-03-24

## 2020-02-10 RX ORDER — TRIAMTERENE AND HYDROCHLOROTHIAZIDE 37.5; 25 MG/1; MG/1
1 TABLET ORAL DAILY
Qty: 90 TABLET | Refills: 2 | Status: SHIPPED | OUTPATIENT
Start: 2020-02-10 | End: 2020-02-10 | Stop reason: SDUPTHER

## 2020-02-10 ASSESSMENT — ENCOUNTER SYMPTOMS
CONSTITUTIONAL NEGATIVE: 1
RESPIRATORY NEGATIVE: 1
GASTROINTESTINAL NEGATIVE: 1
EYES NEGATIVE: 1
ENDOCRINE NEGATIVE: 1
PSYCHIATRIC NEGATIVE: 1
NEUROLOGICAL NEGATIVE: 1

## 2020-02-11 LAB — PSA SERPL-MCNC: 1.03 NG/ML

## 2020-02-14 ENCOUNTER — WALK IN (OUTPATIENT)
Dept: URGENT CARE | Age: 75
End: 2020-02-14

## 2020-02-14 ENCOUNTER — IMAGING SERVICES (OUTPATIENT)
Dept: GENERAL RADIOLOGY | Age: 75
End: 2020-02-14

## 2020-02-14 VITALS
TEMPERATURE: 98.4 F | DIASTOLIC BLOOD PRESSURE: 90 MMHG | OXYGEN SATURATION: 98 % | BODY MASS INDEX: 26.96 KG/M2 | SYSTOLIC BLOOD PRESSURE: 174 MMHG | RESPIRATION RATE: 20 BRPM | HEIGHT: 75 IN | WEIGHT: 216.82 LBS | HEART RATE: 61 BPM

## 2020-02-14 DIAGNOSIS — M25.421 ELBOW SWELLING, RIGHT: ICD-10-CM

## 2020-02-14 DIAGNOSIS — M25.421 ELBOW SWELLING, RIGHT: Primary | ICD-10-CM

## 2020-02-14 PROCEDURE — 99212 OFFICE O/P EST SF 10 MIN: CPT | Performed by: NURSE PRACTITIONER

## 2020-02-14 PROCEDURE — 73080 X-RAY EXAM OF ELBOW: CPT | Performed by: EMERGENCY MEDICINE

## 2020-03-11 ENCOUNTER — OFFICE VISIT (OUTPATIENT)
Dept: PODIATRY CLINIC | Facility: CLINIC | Age: 75
End: 2020-03-11
Payer: MEDICARE

## 2020-03-11 DIAGNOSIS — B35.1 ONYCHOMYCOSIS: ICD-10-CM

## 2020-03-11 DIAGNOSIS — M79.674 PAIN OF TOE OF RIGHT FOOT: Primary | ICD-10-CM

## 2020-03-11 PROCEDURE — 11720 DEBRIDE NAIL 1-5: CPT | Performed by: PODIATRIST

## 2020-03-11 NOTE — PROGRESS NOTES
HPI:    Patient ID: Liz Keith is a 66year old male. This 57-year-old male presents for care associated with his painful toenails of the right foot. Only the nails of this foot have a mycotic change consistent with the discomforts.   Patient reports No ulcerations or infections were noted upon debridement.   I anticipate relief and will see patient for care if and when symptoms redevelop         ASSESSMENT/PLAN:   Pain of toe of right foot  (primary encounter diagnosis)  Onychomycosis    No orders of

## 2020-03-23 DIAGNOSIS — N52.9 ERECTILE DYSFUNCTION, UNSPECIFIED ERECTILE DYSFUNCTION TYPE: ICD-10-CM

## 2020-03-24 RX ORDER — TADALAFIL 5 MG/1
5 TABLET ORAL DAILY
Qty: 30 TABLET | Refills: 0 | Status: SHIPPED | OUTPATIENT
Start: 2020-03-24 | End: 2020-06-17

## 2020-03-26 ENCOUNTER — TELEPHONE (OUTPATIENT)
Dept: SCHEDULING | Age: 75
End: 2020-03-26

## 2020-03-26 ENCOUNTER — TELEPHONE (OUTPATIENT)
Dept: FAMILY MEDICINE | Age: 75
End: 2020-03-26

## 2020-05-22 ENCOUNTER — OFFICE VISIT (OUTPATIENT)
Dept: PODIATRY CLINIC | Facility: CLINIC | Age: 75
End: 2020-05-22
Payer: MEDICARE

## 2020-05-22 DIAGNOSIS — M79.674 PAIN OF TOE OF RIGHT FOOT: Primary | ICD-10-CM

## 2020-05-22 DIAGNOSIS — B35.1 ONYCHOMYCOSIS: ICD-10-CM

## 2020-05-22 PROCEDURE — 11720 DEBRIDE NAIL 1-5: CPT | Performed by: PODIATRIST

## 2020-05-22 NOTE — PROGRESS NOTES
HPI:    Patient ID: Shmuel Marie is a 66year old male. This 77-year-old male presents with recurrent pain in this primarily associated with the nails of his right foot. He has had previous relief by treatment.     ROS:     I did review his medical st Anticipate relief will see patient for care as symptoms redevelop         ASSESSMENT/PLAN:   Pain of toe of right foot  (primary encounter diagnosis)  Onychomycosis    No orders of the defined types were placed in this encounter.       Meds This Visit:  Damien Murphy

## 2020-06-14 DIAGNOSIS — J30.9 ALLERGIC RHINITIS, UNSPECIFIED SEASONALITY, UNSPECIFIED TRIGGER: ICD-10-CM

## 2020-06-15 DIAGNOSIS — N52.9 ERECTILE DYSFUNCTION, UNSPECIFIED ERECTILE DYSFUNCTION TYPE: ICD-10-CM

## 2020-06-16 RX ORDER — FLUTICASONE PROPIONATE 50 MCG
SPRAY, SUSPENSION (ML) NASAL
Qty: 16 G | Refills: 3 | Status: SHIPPED | OUTPATIENT
Start: 2020-06-16 | End: 2020-11-14

## 2020-06-17 DIAGNOSIS — N52.9 ERECTILE DYSFUNCTION, UNSPECIFIED ERECTILE DYSFUNCTION TYPE: ICD-10-CM

## 2020-06-17 RX ORDER — TADALAFIL 5 MG/1
5 TABLET ORAL DAILY
Qty: 90 TABLET | Refills: 1 | Status: SHIPPED | OUTPATIENT
Start: 2020-06-17 | End: 2020-08-31

## 2020-06-17 RX ORDER — TADALAFIL 5 MG/1
5 TABLET ORAL DAILY
Qty: 30 TABLET | Refills: 0 | Status: SHIPPED | OUTPATIENT
Start: 2020-06-17 | End: 2020-06-17

## 2020-07-01 ENCOUNTER — OFFICE VISIT (OUTPATIENT)
Dept: PODIATRY CLINIC | Facility: CLINIC | Age: 75
End: 2020-07-01
Payer: MEDICARE

## 2020-07-01 DIAGNOSIS — B35.1 ONYCHOMYCOSIS: ICD-10-CM

## 2020-07-01 DIAGNOSIS — M79.674 PAIN OF TOE OF RIGHT FOOT: Primary | ICD-10-CM

## 2020-07-01 PROCEDURE — 11720 DEBRIDE NAIL 1-5: CPT | Performed by: PODIATRIST

## 2020-07-01 RX ORDER — FEXOFENADINE HCL 180 MG/1
180 TABLET ORAL DAILY
COMMUNITY
Start: 2020-06-15

## 2020-07-01 NOTE — PROGRESS NOTES
HPI:    Patient ID: Keily Moffett is a 66year old male. 67-year-old male presents with recurrent pain associated with the nails of his right foot. He always has a sense that they grow extremely fast and it feels better once they have been cared for. ulcerations or infections were noted upon debridement.   Anticipate recurrence and will follow-up as symptoms redevelop         ASSESSMENT/PLAN:   Pain of toe of right foot  (primary encounter diagnosis)  Onychomycosis    No orders of the defined types were

## 2020-08-12 ENCOUNTER — OFFICE VISIT (OUTPATIENT)
Dept: PODIATRY CLINIC | Facility: CLINIC | Age: 75
End: 2020-08-12
Payer: MEDICARE

## 2020-08-12 DIAGNOSIS — B35.1 ONYCHOMYCOSIS: ICD-10-CM

## 2020-08-12 DIAGNOSIS — M79.674 PAIN OF TOE OF RIGHT FOOT: Primary | ICD-10-CM

## 2020-08-12 PROCEDURE — 11720 DEBRIDE NAIL 1-5: CPT | Performed by: PODIATRIST

## 2020-08-12 NOTE — PROGRESS NOTES
HPI:    Patient ID: Shmuel Marie is a 66year old male. 55-year-old male presents with the same frustration associated with the nails of his right foot. He is made effort but is no longer able to manage and care for them.   He has relief by local debrid Pain of toe of right foot  (primary encounter diagnosis)  Onychomycosis    No orders of the defined types were placed in this encounter.       Meds This Visit:  Requested Prescriptions      No prescriptions requested or ordered in this encounter       Rafa Juarez

## 2020-08-15 ENCOUNTER — TELEPHONE (OUTPATIENT)
Dept: SCHEDULING | Age: 75
End: 2020-08-15

## 2020-08-16 ENCOUNTER — WALK IN (OUTPATIENT)
Dept: URGENT CARE | Age: 75
End: 2020-08-16

## 2020-08-16 VITALS
WEIGHT: 215.72 LBS | BODY MASS INDEX: 26.96 KG/M2 | OXYGEN SATURATION: 97 % | DIASTOLIC BLOOD PRESSURE: 78 MMHG | RESPIRATION RATE: 18 BRPM | TEMPERATURE: 98.4 F | SYSTOLIC BLOOD PRESSURE: 140 MMHG | HEART RATE: 64 BPM

## 2020-08-16 DIAGNOSIS — L03.119 CELLULITIS OF ANKLE: Primary | ICD-10-CM

## 2020-08-16 PROCEDURE — 99214 OFFICE O/P EST MOD 30 MIN: CPT | Performed by: NURSE PRACTITIONER

## 2020-08-16 RX ORDER — CEPHALEXIN 500 MG/1
500 CAPSULE ORAL 3 TIMES DAILY
Qty: 30 CAPSULE | Refills: 0 | Status: SHIPPED | OUTPATIENT
Start: 2020-08-16 | End: 2020-08-26

## 2020-08-16 ASSESSMENT — ENCOUNTER SYMPTOMS
RESPIRATORY NEGATIVE: 1
VOMITING: 0
CONSTITUTIONAL NEGATIVE: 1
GASTROINTESTINAL NEGATIVE: 1
DIARRHEA: 0
ADENOPATHY: 0

## 2020-08-31 DIAGNOSIS — N52.9 ERECTILE DYSFUNCTION, UNSPECIFIED ERECTILE DYSFUNCTION TYPE: ICD-10-CM

## 2020-08-31 RX ORDER — TADALAFIL 5 MG/1
5 TABLET ORAL DAILY
Qty: 30 TABLET | Refills: 0 | Status: SHIPPED | OUTPATIENT
Start: 2020-08-31 | End: 2020-10-05

## 2020-09-30 DIAGNOSIS — N52.9 ERECTILE DYSFUNCTION, UNSPECIFIED ERECTILE DYSFUNCTION TYPE: ICD-10-CM

## 2020-10-05 DIAGNOSIS — N52.9 ERECTILE DYSFUNCTION, UNSPECIFIED ERECTILE DYSFUNCTION TYPE: ICD-10-CM

## 2020-10-05 RX ORDER — TADALAFIL 5 MG/1
5 TABLET ORAL DAILY
Qty: 30 TABLET | Refills: 0 | OUTPATIENT
Start: 2020-10-05

## 2020-10-05 RX ORDER — TADALAFIL 5 MG/1
5 TABLET ORAL DAILY
Qty: 30 TABLET | Refills: 0 | Status: SHIPPED | OUTPATIENT
Start: 2020-10-05 | End: 2020-11-16 | Stop reason: SDUPTHER

## 2020-10-14 ENCOUNTER — OFFICE VISIT (OUTPATIENT)
Dept: PODIATRY CLINIC | Facility: CLINIC | Age: 75
End: 2020-10-14
Payer: MEDICARE

## 2020-10-14 DIAGNOSIS — M79.674 PAIN OF TOE OF RIGHT FOOT: Primary | ICD-10-CM

## 2020-10-14 DIAGNOSIS — B35.1 ONYCHOMYCOSIS: ICD-10-CM

## 2020-10-14 PROCEDURE — 11720 DEBRIDE NAIL 1-5: CPT | Performed by: PODIATRIST

## 2020-10-14 NOTE — PROGRESS NOTES
HPI:    Patient ID: Jose Engle is a 66year old male. 57-year-old male presents with pain associated with the nails of his right foot. He reports relief by previous treatment.       ROS:     I did review medical status medications and numerous allergi encounter.       Meds This Visit:  Requested Prescriptions      No prescriptions requested or ordered in this encounter       Imaging & Referrals:  None       BI#[id number]

## 2020-11-13 DIAGNOSIS — J30.9 ALLERGIC RHINITIS, UNSPECIFIED SEASONALITY, UNSPECIFIED TRIGGER: ICD-10-CM

## 2020-11-14 ENCOUNTER — TELEPHONE (OUTPATIENT)
Dept: SCHEDULING | Age: 75
End: 2020-11-14

## 2020-11-14 DIAGNOSIS — N52.9 ERECTILE DYSFUNCTION, UNSPECIFIED ERECTILE DYSFUNCTION TYPE: ICD-10-CM

## 2020-11-14 RX ORDER — FLUTICASONE PROPIONATE 50 MCG
SPRAY, SUSPENSION (ML) NASAL
Qty: 16 G | Refills: 3 | Status: SHIPPED | OUTPATIENT
Start: 2020-11-14 | End: 2021-02-11 | Stop reason: SDUPTHER

## 2020-11-16 RX ORDER — TADALAFIL 5 MG/1
5 TABLET ORAL DAILY
Qty: 30 TABLET | Refills: 1 | Status: SHIPPED | OUTPATIENT
Start: 2020-11-16 | End: 2020-12-28 | Stop reason: SDUPTHER

## 2020-11-18 ENCOUNTER — OFFICE VISIT (OUTPATIENT)
Dept: PODIATRY CLINIC | Facility: CLINIC | Age: 75
End: 2020-11-18
Payer: MEDICARE

## 2020-11-18 DIAGNOSIS — M79.674 PAIN OF TOE OF RIGHT FOOT: Primary | ICD-10-CM

## 2020-11-18 DIAGNOSIS — B35.1 ONYCHOMYCOSIS: ICD-10-CM

## 2020-11-18 PROCEDURE — 11720 DEBRIDE NAIL 1-5: CPT | Performed by: PODIATRIST

## 2020-11-18 NOTE — PROGRESS NOTES
HPI:    Patient ID: Jonathan Gibson is a 66year old male. This 29-year-old male presents for care associated with recurrent pain due to his toenails on the right foot. He has had relief by previous treatment.       ROS:   I did go over his medical status placed in this encounter.       Meds This Visit:  Requested Prescriptions      No prescriptions requested or ordered in this encounter       Imaging & Referrals:  None       MA#[id number]

## 2020-11-24 ENCOUNTER — E-ADVICE (OUTPATIENT)
Dept: FAMILY MEDICINE | Age: 75
End: 2020-11-24

## 2020-12-05 DIAGNOSIS — I10 ESSENTIAL HYPERTENSION: ICD-10-CM

## 2020-12-09 RX ORDER — TRIAMTERENE AND HYDROCHLOROTHIAZIDE 37.5; 25 MG/1; MG/1
1 TABLET ORAL DAILY
Qty: 90 TABLET | Refills: 2 | Status: SHIPPED | OUTPATIENT
Start: 2020-12-09 | End: 2021-02-11 | Stop reason: SDUPTHER

## 2020-12-26 ENCOUNTER — TELEPHONE (OUTPATIENT)
Dept: SCHEDULING | Age: 75
End: 2020-12-26

## 2020-12-26 DIAGNOSIS — N52.9 ERECTILE DYSFUNCTION, UNSPECIFIED ERECTILE DYSFUNCTION TYPE: ICD-10-CM

## 2020-12-28 RX ORDER — TADALAFIL 5 MG/1
5 TABLET ORAL DAILY
Qty: 30 TABLET | Refills: 0 | Status: SHIPPED | OUTPATIENT
Start: 2020-12-28 | End: 2021-02-04 | Stop reason: SDUPTHER

## 2020-12-30 ENCOUNTER — OFFICE VISIT (OUTPATIENT)
Dept: PODIATRY CLINIC | Facility: CLINIC | Age: 75
End: 2020-12-30
Payer: MEDICARE

## 2020-12-30 DIAGNOSIS — B35.1 ONYCHOMYCOSIS: ICD-10-CM

## 2020-12-30 DIAGNOSIS — M79.674 PAIN OF TOE OF RIGHT FOOT: Primary | ICD-10-CM

## 2020-12-30 PROCEDURE — 11720 DEBRIDE NAIL 1-5: CPT | Performed by: PODIATRIST

## 2020-12-30 NOTE — PROGRESS NOTES
HPI:    Patient ID: Jose Engle is a 66year old male. This pleasant 22-year-old male presents today with recurrent pain associated with the toenails of his right foot. The last time I saw this patient was November 18.   At that time he states he had c ulcerations or infections were noted upon debridement. This will be a chronic and recurrent frustration and patient chooses this form of care.   Plan follow-up with this patient when symptoms redevelop           ASSESSMENT/PLAN:   Pain of toe of right foot

## 2021-01-01 ENCOUNTER — EXTERNAL RECORD (OUTPATIENT)
Dept: HEALTH INFORMATION MANAGEMENT | Facility: OTHER | Age: 76
End: 2021-01-01

## 2021-01-13 ENCOUNTER — TELEPHONE (OUTPATIENT)
Dept: SCHEDULING | Age: 76
End: 2021-01-13

## 2021-02-04 ENCOUNTER — TELEPHONE (OUTPATIENT)
Dept: SCHEDULING | Age: 76
End: 2021-02-04

## 2021-02-04 DIAGNOSIS — N52.9 ERECTILE DYSFUNCTION, UNSPECIFIED ERECTILE DYSFUNCTION TYPE: ICD-10-CM

## 2021-02-04 RX ORDER — TADALAFIL 5 MG/1
5 TABLET ORAL DAILY
Qty: 30 TABLET | Refills: 0 | Status: SHIPPED | OUTPATIENT
Start: 2021-02-04 | End: 2021-02-11 | Stop reason: SDUPTHER

## 2021-02-04 ASSESSMENT — PATIENT HEALTH QUESTIONNAIRE - PHQ9
CLINICAL INTERPRETATION OF PHQ2 SCORE: NO FURTHER SCREENING NEEDED
CLINICAL INTERPRETATION OF PHQ2 SCORE: 0
2. FEELING DOWN, DEPRESSED OR HOPELESS: NOT AT ALL
1. LITTLE INTEREST OR PLEASURE IN DOING THINGS: NOT AT ALL

## 2021-02-10 ENCOUNTER — OFFICE VISIT (OUTPATIENT)
Dept: PODIATRY CLINIC | Facility: CLINIC | Age: 76
End: 2021-02-10
Payer: MEDICARE

## 2021-02-10 VITALS — BODY MASS INDEX: 26.11 KG/M2 | HEIGHT: 75 IN | WEIGHT: 210 LBS

## 2021-02-10 DIAGNOSIS — B35.1 ONYCHOMYCOSIS: ICD-10-CM

## 2021-02-10 DIAGNOSIS — M79.674 PAIN OF TOE OF RIGHT FOOT: Primary | ICD-10-CM

## 2021-02-11 ENCOUNTER — LAB SERVICES (OUTPATIENT)
Dept: LAB | Age: 76
End: 2021-02-11

## 2021-02-11 ENCOUNTER — OFFICE VISIT (OUTPATIENT)
Dept: FAMILY MEDICINE | Age: 76
End: 2021-02-11

## 2021-02-11 ENCOUNTER — DOCUMENTATION (OUTPATIENT)
Dept: FAMILY MEDICINE | Age: 76
End: 2021-02-11

## 2021-02-11 VITALS
HEIGHT: 75 IN | DIASTOLIC BLOOD PRESSURE: 80 MMHG | RESPIRATION RATE: 18 BRPM | TEMPERATURE: 98 F | HEART RATE: 57 BPM | BODY MASS INDEX: 26.86 KG/M2 | SYSTOLIC BLOOD PRESSURE: 136 MMHG | WEIGHT: 216.05 LBS

## 2021-02-11 DIAGNOSIS — Z12.11 COLON CANCER SCREENING: ICD-10-CM

## 2021-02-11 DIAGNOSIS — Z13.1 SCREENING FOR DIABETES MELLITUS (DM): Primary | ICD-10-CM

## 2021-02-11 DIAGNOSIS — Z00.00 HEALTH CARE MAINTENANCE: Primary | ICD-10-CM

## 2021-02-11 DIAGNOSIS — I10 ESSENTIAL HYPERTENSION: ICD-10-CM

## 2021-02-11 DIAGNOSIS — Z13.1 SCREENING FOR DIABETES MELLITUS (DM): ICD-10-CM

## 2021-02-11 DIAGNOSIS — Z12.5 PROSTATE CANCER SCREENING: ICD-10-CM

## 2021-02-11 DIAGNOSIS — J30.9 ALLERGIC RHINITIS, UNSPECIFIED SEASONALITY, UNSPECIFIED TRIGGER: ICD-10-CM

## 2021-02-11 DIAGNOSIS — N52.9 ERECTILE DYSFUNCTION, UNSPECIFIED ERECTILE DYSFUNCTION TYPE: ICD-10-CM

## 2021-02-11 LAB
ALBUMIN SERPL-MCNC: 3.7 G/DL (ref 3.6–5.1)
ALBUMIN/GLOB SERPL: 1.2 {RATIO} (ref 1–2.4)
ALP SERPL-CCNC: 57 UNITS/L (ref 45–117)
ALT SERPL-CCNC: 34 UNITS/L
ANION GAP SERPL CALC-SCNC: 11 MMOL/L (ref 10–20)
APPEARANCE UR: CLEAR
AST SERPL-CCNC: 22 UNITS/L
BILIRUB SERPL-MCNC: 0.5 MG/DL (ref 0.2–1)
BILIRUB UR QL STRIP: NEGATIVE
BUN SERPL-MCNC: 17 MG/DL (ref 6–20)
BUN/CREAT SERPL: 14 (ref 7–25)
CALCIUM SERPL-MCNC: 9.1 MG/DL (ref 8.4–10.2)
CHLORIDE SERPL-SCNC: 107 MMOL/L (ref 98–107)
CHOLEST SERPL-MCNC: 156 MG/DL
CHOLEST/HDLC SERPL: 2.7 {RATIO}
CO2 SERPL-SCNC: 31 MMOL/L (ref 21–32)
COLOR UR: YELLOW
CREAT SERPL-MCNC: 1.18 MG/DL (ref 0.67–1.17)
DEPRECATED RDW RBC: 42.5 FL (ref 39–50)
ERYTHROCYTE [DISTWIDTH] IN BLOOD: 13.1 % (ref 11–15)
FASTING DURATION TIME PATIENT: 12 HOURS
FASTING DURATION TIME PATIENT: 12 HOURS
GFR SERPLBLD BASED ON 1.73 SQ M-ARVRAT: 68 ML/MIN/1.73M2
GLOBULIN SER-MCNC: 3.2 G/DL (ref 2–4)
GLUCOSE SERPL-MCNC: 92 MG/DL (ref 65–99)
GLUCOSE UR STRIP-MCNC: NEGATIVE MG/DL
HBA1C MFR BLD: 5.8 % (ref 4.5–5.6)
HCT VFR BLD CALC: 44.9 % (ref 39–51)
HDLC SERPL-MCNC: 58 MG/DL
HGB BLD-MCNC: 14.6 G/DL (ref 13–17)
HGB UR QL STRIP: NEGATIVE
KETONES UR STRIP-MCNC: NEGATIVE MG/DL
LDLC SERPL CALC-MCNC: 80 MG/DL
LEUKOCYTE ESTERASE UR QL STRIP: NEGATIVE
MCH RBC QN AUTO: 28.7 PG (ref 26–34)
MCHC RBC AUTO-ENTMCNC: 32.5 G/DL (ref 32–36.5)
MCV RBC AUTO: 88.4 FL (ref 78–100)
NITRITE UR QL STRIP: NEGATIVE
NONHDLC SERPL-MCNC: 98 MG/DL
NRBC BLD MANUAL-RTO: 0 /100 WBC
PH UR STRIP: 5 [PH] (ref 5–7)
PLATELET # BLD AUTO: 180 K/MCL (ref 140–450)
POTASSIUM SERPL-SCNC: 4.6 MMOL/L (ref 3.4–5.1)
PROT SERPL-MCNC: 6.9 G/DL (ref 6.4–8.2)
PROT UR STRIP-MCNC: NEGATIVE MG/DL
PSA SERPL-MCNC: 0.82 NG/ML
RBC # BLD: 5.08 MIL/MCL (ref 4.5–5.9)
SODIUM SERPL-SCNC: 144 MMOL/L (ref 135–145)
SP GR UR STRIP: 1.02 (ref 1–1.03)
TRIGL SERPL-MCNC: 92 MG/DL
TSH SERPL-ACNC: 0.47 MCUNITS/ML (ref 0.35–5)
UROBILINOGEN UR STRIP-MCNC: 0.2 MG/DL
WBC # BLD: 3.6 K/MCL (ref 4.2–11)

## 2021-02-11 PROCEDURE — 80053 COMPREHEN METABOLIC PANEL: CPT | Performed by: CLINICAL MEDICAL LABORATORY

## 2021-02-11 PROCEDURE — 80061 LIPID PANEL: CPT | Performed by: CLINICAL MEDICAL LABORATORY

## 2021-02-11 PROCEDURE — 83036 HEMOGLOBIN GLYCOSYLATED A1C: CPT | Performed by: CLINICAL MEDICAL LABORATORY

## 2021-02-11 PROCEDURE — G0103 PSA SCREENING: HCPCS | Performed by: CLINICAL MEDICAL LABORATORY

## 2021-02-11 PROCEDURE — 84443 ASSAY THYROID STIM HORMONE: CPT | Performed by: CLINICAL MEDICAL LABORATORY

## 2021-02-11 PROCEDURE — 81003 URINALYSIS AUTO W/O SCOPE: CPT | Performed by: CLINICAL MEDICAL LABORATORY

## 2021-02-11 PROCEDURE — 99214 OFFICE O/P EST MOD 30 MIN: CPT

## 2021-02-11 PROCEDURE — 85027 COMPLETE CBC AUTOMATED: CPT | Performed by: CLINICAL MEDICAL LABORATORY

## 2021-02-11 PROCEDURE — 36415 COLL VENOUS BLD VENIPUNCTURE: CPT | Performed by: CLINICAL MEDICAL LABORATORY

## 2021-02-11 RX ORDER — FEXOFENADINE HCL 180 MG/1
180 TABLET ORAL DAILY
Qty: 90 TABLET | Refills: 0 | Status: SHIPPED | OUTPATIENT
Start: 2021-02-11 | End: 2021-10-16

## 2021-02-11 RX ORDER — FLUTICASONE PROPIONATE 50 MCG
2 SPRAY, SUSPENSION (ML) NASAL DAILY
Qty: 16 G | Refills: 3 | Status: SHIPPED | OUTPATIENT
Start: 2021-02-11 | End: 2021-08-19

## 2021-02-11 RX ORDER — TRIAMTERENE AND HYDROCHLOROTHIAZIDE 37.5; 25 MG/1; MG/1
1 TABLET ORAL DAILY
Qty: 90 TABLET | Refills: 2 | Status: SHIPPED | OUTPATIENT
Start: 2021-02-11 | End: 2022-06-16

## 2021-02-11 RX ORDER — MONTELUKAST SODIUM 10 MG/1
10 TABLET ORAL DAILY
Qty: 90 TABLET | Refills: 3 | Status: SHIPPED | OUTPATIENT
Start: 2021-02-11 | End: 2022-06-16

## 2021-02-11 RX ORDER — TADALAFIL 5 MG/1
5 TABLET ORAL DAILY
Qty: 30 TABLET | Refills: 3 | Status: SHIPPED | OUTPATIENT
Start: 2021-02-11 | End: 2021-08-13

## 2021-02-11 SDOH — ECONOMIC STABILITY: TRANSPORTATION INSECURITY
IN THE PAST 12 MONTHS, HAS THE LACK OF TRANSPORTATION KEPT YOU FROM MEDICAL APPOINTMENTS OR FROM GETTING MEDICATIONS?: NO

## 2021-02-11 SDOH — HEALTH STABILITY: MENTAL HEALTH: HOW OFTEN DO YOU HAVE A DRINK CONTAINING ALCOHOL?: NEVER

## 2021-02-11 SDOH — SOCIAL STABILITY: SOCIAL NETWORK: IN A TYPICAL WEEK, HOW MANY TIMES DO YOU TALK ON THE PHONE WITH FAMILY, FRIENDS, OR NEIGHBORS?: ONCE A WEEK

## 2021-02-11 SDOH — ECONOMIC STABILITY: FOOD INSECURITY: WITHIN THE PAST 12 MONTHS, YOU WORRIED THAT YOUR FOOD WOULD RUN OUT BEFORE YOU GOT MONEY TO BUY MORE.: NEVER TRUE

## 2021-02-11 SDOH — ECONOMIC STABILITY: FOOD INSECURITY: WITHIN THE PAST 12 MONTHS, THE FOOD YOU BOUGHT JUST DIDN'T LAST AND YOU DIDN'T HAVE MONEY TO GET MORE.: NEVER TRUE

## 2021-02-11 SDOH — SOCIAL STABILITY: SOCIAL NETWORK: ARE YOU MARRIED, WIDOWED, DIVORCED, SEPARATED, NEVER MARRIED, OR LIVING WITH A PARTNER?: MARRIED

## 2021-02-11 SDOH — ECONOMIC STABILITY: INCOME INSECURITY: HOW HARD IS IT FOR YOU TO PAY FOR THE VERY BASICS LIKE FOOD, HOUSING, MEDICAL CARE, AND HEATING?: NOT HARD AT ALL

## 2021-02-11 SDOH — SOCIAL STABILITY: SOCIAL INSECURITY: WITHIN THE LAST YEAR, HAVE YOU BEEN HUMILIATED OR EMOTIONALLY ABUSED IN OTHER WAYS BY YOUR PARTNER OR EX-PARTNER?: NO

## 2021-02-11 SDOH — ECONOMIC STABILITY: TRANSPORTATION INSECURITY
IN THE PAST 12 MONTHS, HAS LACK OF TRANSPORTATION KEPT YOU FROM MEETINGS, WORK, OR FROM GETTING THINGS NEEDED FOR DAILY LIVING?: NO

## 2021-02-11 SDOH — HEALTH STABILITY: PHYSICAL HEALTH: ON AVERAGE, HOW MANY MINUTES DO YOU ENGAGE IN EXERCISE AT THIS LEVEL?: 120 MIN

## 2021-02-11 SDOH — SOCIAL STABILITY: SOCIAL INSECURITY: WITHIN THE LAST YEAR, HAVE YOU BEEN AFRAID OF YOUR PARTNER OR EX-PARTNER?: NO

## 2021-02-11 SDOH — HEALTH STABILITY: PHYSICAL HEALTH: ON AVERAGE, HOW MANY DAYS PER WEEK DO YOU ENGAGE IN MODERATE TO STRENUOUS EXERCISE (LIKE A BRISK WALK)?: 3 DAYS

## 2021-02-11 SDOH — SOCIAL STABILITY: SOCIAL NETWORK: HOW OFTEN DO YOU GET TOGETHER WITH FRIENDS OR RELATIVES?: TWICE A WEEK

## 2021-02-11 ASSESSMENT — GERIATRIC DEPRESSION SCALE SHORT VERSION (GDS-SV)
HAVE YOU DROPPED MANY OF YOUR ACTIVITIES AND INTERESTS?: NO
DO YOU OFTEN GET BORED: NO
ARE YOU AFRAID THAT SOMETHING BAD IS GOING TO HAPPEN TO YOU: NO
DO YOU OFTEN FEEL HELPLESS: NO
DO YOU THINK IT IS WONDERFUL TO BE ALIVE NOW: YES
DO YOU FEEL FULL OF ENERGY: YES
DO YOU FEEL YOU HAVE MORE PROBLEMS WITH MEMORY THAN MOST: NO
ARE YOU IN GOOD SPIRITS MOST OF THE TIME: YES
DO YOU FIND LIFE VERY EXCITING: YES
DO YOU PREFER TO STAY AT HOME, RATHER THAN GOING OUT AND DOING NEW THINGS: NO
IS IT HARD FOR YOU TO GET STARTED ON NEW PROJECTS?: NO
GDS TOTAL SCORE: 0
DO YOU FEEL THAT YOUR LIFE IS EMPTY: NO
ARE YOU BASICALLY SATISFIED WITH YOUR LIFE: YES
DO YOU FEEL PRETTY WORTHLESS THE WAY YOU ARE NOW: NO
DO YOU FEEL HAPPY MOST OF THE TIME: YES

## 2021-02-11 ASSESSMENT — ANXIETY QUESTIONNAIRES
1. FEELING NERVOUS, ANXIOUS, OR ON EDGE: 0
2. NOT BEING ABLE TO STOP OR CONTROL WORRYING: 0
7. FEELING AFRAID AS IF SOMETHING AWFUL MIGHT HAPPEN: 0
6. BECOMING EASILY ANNOYED OR IRRITABLE: NOT AT ALL
4. TROUBLE RELAXING: NOT AT ALL
2. NOT BEING ABLE TO STOP OR CONTROL WORRYING: NOT AT ALL
5. BEING SO RESTLESS THAT IT IS HARD TO SIT STILL: NOT AT ALL
3. WORRYING TOO MUCH ABOUT DIFFERENT THINGS: NOT AT ALL
GAD7 TOTAL SCORE: 0
6. BECOMING EASILY ANNOYED OR IRRITABLE: 0
1. FEELING NERVOUS, ANXIOUS, OR ON EDGE: NOT AT ALL
5. BEING SO RESTLESS THAT IT IS HARD TO SIT STILL: 0
3. WORRYING TOO MUCH ABOUT DIFFERENT THINGS: 0
4. TROUBLE RELAXING: 0
7. FEELING AFRAID AS IF SOMETHING AWFUL MIGHT HAPPEN: NOT AT ALL

## 2021-02-11 ASSESSMENT — ENCOUNTER SYMPTOMS
PSYCHIATRIC NEGATIVE: 1
ENDOCRINE NEGATIVE: 1
NEUROLOGICAL NEGATIVE: 1
CONSTITUTIONAL NEGATIVE: 1
HEMATOLOGIC/LYMPHATIC NEGATIVE: 1
RESPIRATORY NEGATIVE: 1
GASTROINTESTINAL NEGATIVE: 1

## 2021-02-11 ASSESSMENT — GERIATRIC MINI NUTRITIONAL ASSESSMENT (MNA)
E NEUROPSYCHOLOGICAL PROBLEMS: NO PSYCHOLOGICAL PROBLEMS
B WEIGHT LOSS DURING THE LAST 3 MONTHS: NO WEIGHT LOSS
A HAS FOOD INTAKE DECLINED OVER THE PAST 3 MONTHS DUE TO LOSS OF APPETITE, DIGESTIVE PROBLEMS, CHEWING OR SWALLOWING DIFFICULTIES?: NO DECREASE IN FOOD INTAKE
C GENERAL MOBILITY: GOES OUT

## 2021-02-11 ASSESSMENT — ACTIVITIES OF DAILY LIVING (ADL)
ADL_BEFORE_ADMISSION: INDEPENDENT
PREPARING MEALS: INDEPENDENT
MANAGING FINANCES: INDEPENDENT
TAKING MEDICATION: INDEPENDENT
RECENT_DECLINE_ADL: NO
EATING: INDEPENDENT
DOING HOUSEWORK: INDEPENDENT
USING TELEPHONE: INDEPENDENT
NEEDS ASSISTANCE WITH FOOD: INDEPENDENT
USING TRANSPORTATION: INDEPENDENT
GROCERY SHOPPING: INDEPENDENT
ADL_SHORT_OF_BREATH: NO
PILL BOX USED: NO
ADL_SCORE: 12
STIL DRIVING: YES

## 2021-02-11 ASSESSMENT — PATIENT HEALTH QUESTIONNAIRE - PHQ9: 1. LITTLE INTEREST OR PLEASURE IN DOING THINGS: NOT AT ALL

## 2021-02-11 ASSESSMENT — MINI COG
PATIENT WAS GIVEN REPEAT BACK WORDS FROM VERSION: IMMEDIATE REPEAT BACK - APPLE WATCH PENNY
ABLE TO REPEAT THE 3 WORDS GIVEN PREVIOUSLY?: APPLE;WATCH;PENNY

## 2021-02-11 ASSESSMENT — PAIN SCALES - GENERAL: PAINLEVEL: 0

## 2021-02-11 ASSESSMENT — COGNITIVE AND FUNCTIONAL STATUS - GENERAL
DO YOU HAVE DIFFICULTY DRESSING OR BATHING: NO
DO YOU HAVE SERIOUS DIFFICULTY WALKING OR CLIMBING STAIRS: NO
BECAUSE OF A PHYSICAL, MENTAL, OR EMOTIONAL CONDITION, DO YOU HAVE DIFFICULTY DOING ERRANDS ALONE: NO
BECAUSE OF A PHYSICAL, MENTAL, OR EMOTIONAL CONDITION, DO YOU HAVE SERIOUS DIFFICULTY CONCENTRATING, REMEMBERING OR MAKING DECISIONS: NO

## 2021-02-13 PROCEDURE — 82274 ASSAY TEST FOR BLOOD FECAL: CPT | Performed by: CLINICAL MEDICAL LABORATORY

## 2021-02-14 LAB — HEMOCCULT STL QL: POSITIVE

## 2021-03-31 ENCOUNTER — E-ADVICE (OUTPATIENT)
Dept: FAMILY MEDICINE | Age: 76
End: 2021-03-31

## 2021-03-31 DIAGNOSIS — Z12.11 COLON CANCER SCREENING: Primary | ICD-10-CM

## 2021-04-01 ENCOUNTER — TELEPHONE (OUTPATIENT)
Dept: FAMILY MEDICINE | Age: 76
End: 2021-04-01

## 2021-04-07 ENCOUNTER — OFFICE VISIT (OUTPATIENT)
Dept: PODIATRY CLINIC | Facility: CLINIC | Age: 76
End: 2021-04-07
Payer: MEDICARE

## 2021-04-07 DIAGNOSIS — M79.674 PAIN OF TOE OF RIGHT FOOT: Primary | ICD-10-CM

## 2021-04-07 DIAGNOSIS — B35.1 ONYCHOMYCOSIS: ICD-10-CM

## 2021-04-07 PROCEDURE — 11720 DEBRIDE NAIL 1-5: CPT | Performed by: PODIATRIST

## 2021-04-07 RX ORDER — AZELASTINE HYDROCHLORIDE 0.5 MG/ML
SOLUTION/ DROPS OPHTHALMIC
COMMUNITY
Start: 2021-03-10

## 2021-04-07 NOTE — PROGRESS NOTES
HPI:    Patient ID: Margarete Collet is a 66year old male. This 68-year-old male presents today with pain associated with the nails of his right foot. The last time I saw this patient was February 10 of this year. He reports relief by previous care. Pain of toe of right foot  (primary encounter diagnosis)  Onychomycosis    No orders of the defined types were placed in this encounter.       Meds This Visit:  Requested Prescriptions      No prescriptions requested or ordered in this encounter       Taisha Holloway

## 2021-04-14 ENCOUNTER — TELEPHONIC VISIT (OUTPATIENT)
Dept: GASTROENTEROLOGY | Age: 76
End: 2021-04-14

## 2021-04-14 DIAGNOSIS — R19.5 FECAL OCCULT BLOOD TEST POSITIVE: Primary | ICD-10-CM

## 2021-04-14 DIAGNOSIS — Z86.010 HISTORY OF COLON POLYPS: ICD-10-CM

## 2021-04-14 PROCEDURE — 99442 TELEPHONE E&M BY PHYSICIAN EST PT NOT ORIG PREV 7 DAYS 11-20 MIN: CPT | Performed by: INTERNAL MEDICINE

## 2021-04-14 RX ORDER — SODIUM, POTASSIUM,MAG SULFATES 17.5-3.13G
SOLUTION, RECONSTITUTED, ORAL ORAL
Qty: 1 KIT | Refills: 0 | Status: SHIPPED | OUTPATIENT
Start: 2021-04-14 | End: 2021-06-12 | Stop reason: HOSPADM

## 2021-04-29 ENCOUNTER — TELEPHONE (OUTPATIENT)
Dept: GASTROENTEROLOGY | Age: 76
End: 2021-04-29

## 2021-04-29 DIAGNOSIS — Z01.812 ENCOUNTER FOR PREPROCEDURE SCREENING LABORATORY TESTING FOR COVID-19: Primary | ICD-10-CM

## 2021-04-29 DIAGNOSIS — Z11.52 ENCOUNTER FOR PREPROCEDURE SCREENING LABORATORY TESTING FOR COVID-19: Primary | ICD-10-CM

## 2021-05-26 VITALS
SYSTOLIC BLOOD PRESSURE: 106 MMHG | HEART RATE: 56 BPM | BODY MASS INDEX: 26.23 KG/M2 | HEIGHT: 75 IN | HEART RATE: 57 BPM | RESPIRATION RATE: 18 BRPM | TEMPERATURE: 98 F | WEIGHT: 210.98 LBS | BODY MASS INDEX: 26.86 KG/M2 | RESPIRATION RATE: 16 BRPM | HEIGHT: 75 IN | WEIGHT: 216 LBS | OXYGEN SATURATION: 96 % | SYSTOLIC BLOOD PRESSURE: 140 MMHG | DIASTOLIC BLOOD PRESSURE: 80 MMHG | BODY MASS INDEX: 26.86 KG/M2 | TEMPERATURE: 97 F | RESPIRATION RATE: 18 BRPM | WEIGHT: 216 LBS | SYSTOLIC BLOOD PRESSURE: 136 MMHG | DIASTOLIC BLOOD PRESSURE: 74 MMHG | HEIGHT: 75 IN | HEART RATE: 72 BPM | TEMPERATURE: 96.6 F | OXYGEN SATURATION: 97 % | DIASTOLIC BLOOD PRESSURE: 70 MMHG | OXYGEN SATURATION: 100 %

## 2021-06-02 ENCOUNTER — OFFICE VISIT (OUTPATIENT)
Dept: PODIATRY CLINIC | Facility: CLINIC | Age: 76
End: 2021-06-02
Payer: MEDICARE

## 2021-06-02 VITALS — BODY MASS INDEX: 26.95 KG/M2 | HEIGHT: 74 IN | WEIGHT: 210 LBS

## 2021-06-02 DIAGNOSIS — M79.674 PAIN OF TOE OF RIGHT FOOT: Primary | ICD-10-CM

## 2021-06-02 DIAGNOSIS — B35.1 ONYCHOMYCOSIS: ICD-10-CM

## 2021-06-02 PROCEDURE — 11720 DEBRIDE NAIL 1-5: CPT | Performed by: PODIATRIST

## 2021-06-02 RX ORDER — SODIUM, POTASSIUM,MAG SULFATES 17.5-3.13G
SOLUTION, RECONSTITUTED, ORAL ORAL
COMMUNITY
Start: 2021-04-15 | End: 2021-07-28 | Stop reason: ALTCHOICE

## 2021-06-10 ENCOUNTER — LAB SERVICES (OUTPATIENT)
Dept: LAB | Age: 76
End: 2021-06-10

## 2021-06-10 DIAGNOSIS — Z01.812 PRE-PROCEDURAL LABORATORY EXAMINATION: Primary | ICD-10-CM

## 2021-06-10 DIAGNOSIS — Z01.812 ENCOUNTER FOR PREPROCEDURE SCREENING LABORATORY TESTING FOR COVID-19: ICD-10-CM

## 2021-06-10 DIAGNOSIS — Z11.52 ENCOUNTER FOR PREPROCEDURE SCREENING LABORATORY TESTING FOR COVID-19: ICD-10-CM

## 2021-06-10 PROCEDURE — U0003 INFECTIOUS AGENT DETECTION BY NUCLEIC ACID (DNA OR RNA); SEVERE ACUTE RESPIRATORY SYNDROME CORONAVIRUS 2 (SARS-COV-2) (CORONAVIRUS DISEASE [COVID-19]), AMPLIFIED PROBE TECHNIQUE, MAKING USE OF HIGH THROUGHPUT TECHNOLOGIES AS DESCRIBED BY CMS-2020-01-R: HCPCS | Performed by: CLINICAL MEDICAL LABORATORY

## 2021-06-10 PROCEDURE — U0005 INFEC AGEN DETEC AMPLI PROBE: HCPCS | Performed by: CLINICAL MEDICAL LABORATORY

## 2021-06-11 LAB
SARS-COV-2 RNA RESP QL NAA+PROBE: NOT DETECTED
SERVICE CMNT-IMP: NORMAL
SERVICE CMNT-IMP: NORMAL

## 2021-06-11 RX ORDER — AZELASTINE HYDROCHLORIDE 0.5 MG/ML
1 SOLUTION/ DROPS OPHTHALMIC PRN
COMMUNITY
Start: 2021-03-10

## 2021-06-11 RX ORDER — MULTIVITAMIN
1 CAPSULE ORAL DAILY
COMMUNITY

## 2021-06-11 RX ORDER — TRIAMCINOLONE ACETONIDE 1 MG/G
1 OINTMENT TOPICAL PRN
COMMUNITY

## 2021-06-12 ENCOUNTER — HOSPITAL ENCOUNTER (OUTPATIENT)
Dept: ADMINISTRATIVE | Age: 76
Discharge: HOME | End: 2021-06-12
Attending: INTERNAL MEDICINE

## 2021-06-12 ENCOUNTER — ANESTHESIA EVENT (OUTPATIENT)
Dept: ADMINISTRATIVE | Age: 76
End: 2021-06-12

## 2021-06-12 ENCOUNTER — ANESTHESIA (OUTPATIENT)
Dept: ADMINISTRATIVE | Age: 76
End: 2021-06-12

## 2021-06-12 VITALS
WEIGHT: 210 LBS | BODY MASS INDEX: 26.11 KG/M2 | SYSTOLIC BLOOD PRESSURE: 113 MMHG | RESPIRATION RATE: 14 BRPM | DIASTOLIC BLOOD PRESSURE: 87 MMHG | HEART RATE: 55 BPM | HEIGHT: 75 IN | OXYGEN SATURATION: 99 % | TEMPERATURE: 97.9 F

## 2021-06-12 DIAGNOSIS — K63.89 COLONIC MASS: Primary | ICD-10-CM

## 2021-06-12 DIAGNOSIS — R19.5 OTHER FECAL ABNORMALITIES: ICD-10-CM

## 2021-06-12 DIAGNOSIS — Z86.010 PERSONAL HISTORY OF COLONIC POLYPS: ICD-10-CM

## 2021-06-12 PROCEDURE — 13000001 HB PHASE II RECOVERY EA 30 MINUTES

## 2021-06-12 PROCEDURE — 45385 COLONOSCOPY W/LESION REMOVAL: CPT | Performed by: INTERNAL MEDICINE

## 2021-06-12 PROCEDURE — 13000024 HB GI COMPLEX CASE S/U + 1ST 15 MIN

## 2021-06-12 PROCEDURE — 88305 TISSUE EXAM BY PATHOLOGIST: CPT | Performed by: INTERNAL MEDICINE

## 2021-06-12 PROCEDURE — 45380 COLONOSCOPY AND BIOPSY: CPT | Performed by: INTERNAL MEDICINE

## 2021-06-12 PROCEDURE — 10002800 HB RX 250 W HCPCS

## 2021-06-12 PROCEDURE — 13000008 HB ANESTHESIA MAC OUTSIDE OR

## 2021-06-12 PROCEDURE — 10002807 HB RX 258

## 2021-06-12 RX ORDER — PROPOFOL 10 MG/ML
INJECTION, EMULSION INTRAVENOUS PRN
Status: DISCONTINUED | OUTPATIENT
Start: 2021-06-12 | End: 2021-06-12

## 2021-06-12 RX ORDER — SODIUM CHLORIDE 9 MG/ML
INJECTION, SOLUTION INTRAVENOUS CONTINUOUS PRN
Status: DISCONTINUED | OUTPATIENT
Start: 2021-06-12 | End: 2021-06-12

## 2021-06-12 RX ORDER — ONDANSETRON 2 MG/ML
4 INJECTION INTRAMUSCULAR; INTRAVENOUS 2 TIMES DAILY PRN
Status: DISCONTINUED | OUTPATIENT
Start: 2021-06-12 | End: 2021-06-14 | Stop reason: HOSPADM

## 2021-06-12 RX ORDER — EPHEDRINE SULFATE/0.9% NACL/PF 50 MG/10ML
10 SYRINGE (ML) INTRAVENOUS
Status: DISCONTINUED | OUTPATIENT
Start: 2021-06-12 | End: 2021-06-14 | Stop reason: HOSPADM

## 2021-06-12 RX ORDER — DEXAMETHASONE SODIUM PHOSPHATE 4 MG/ML
4 INJECTION, SOLUTION INTRA-ARTICULAR; INTRALESIONAL; INTRAMUSCULAR; INTRAVENOUS; SOFT TISSUE
Status: DISCONTINUED | OUTPATIENT
Start: 2021-06-12 | End: 2021-06-14 | Stop reason: HOSPADM

## 2021-06-12 RX ADMIN — PROPOFOL 100 MCG/KG/MIN: 10 INJECTION, EMULSION INTRAVENOUS at 13:17

## 2021-06-12 RX ADMIN — PROPOFOL 75 MG: 10 INJECTION, EMULSION INTRAVENOUS at 13:17

## 2021-06-12 RX ADMIN — SODIUM CHLORIDE: 9 INJECTION, SOLUTION INTRAVENOUS at 13:17

## 2021-06-12 ASSESSMENT — PAIN SCALES - GENERAL
PAINLEVEL_OUTOF10: 0

## 2021-06-12 ASSESSMENT — ENCOUNTER SYMPTOMS: EXERCISE TOLERANCE: GOOD (>4 METS)

## 2021-06-14 ENCOUNTER — TELEPHONE (OUTPATIENT)
Dept: SCHEDULING | Age: 76
End: 2021-06-14

## 2021-06-15 ENCOUNTER — TELEPHONE (OUTPATIENT)
Dept: SCHEDULING | Age: 76
End: 2021-06-15

## 2021-06-16 ENCOUNTER — TELEPHONE (OUTPATIENT)
Dept: GASTROENTEROLOGY | Age: 76
End: 2021-06-16

## 2021-06-17 ENCOUNTER — TELEPHONE (OUTPATIENT)
Dept: SCHEDULING | Age: 76
End: 2021-06-17

## 2021-06-18 ENCOUNTER — LAB SERVICES (OUTPATIENT)
Dept: LAB | Age: 76
End: 2021-06-18

## 2021-06-18 ENCOUNTER — TELEPHONE (OUTPATIENT)
Dept: GASTROENTEROLOGY | Age: 76
End: 2021-06-18

## 2021-06-18 DIAGNOSIS — K63.89 COLONIC MASS: Primary | ICD-10-CM

## 2021-06-18 DIAGNOSIS — K63.89 COLONIC MASS: ICD-10-CM

## 2021-06-18 LAB
ALBUMIN SERPL-MCNC: 3.8 G/DL (ref 3.6–5.1)
ALBUMIN/GLOB SERPL: 1.2 {RATIO} (ref 1–2.4)
ALP SERPL-CCNC: 55 UNITS/L (ref 45–117)
ALT SERPL-CCNC: 33 UNITS/L
ANION GAP SERPL CALC-SCNC: 9 MMOL/L (ref 10–20)
ASR DISCLAIMER: NORMAL
AST SERPL-CCNC: 17 UNITS/L
BASOPHILS # BLD: 0 K/MCL (ref 0–0.3)
BASOPHILS NFR BLD: 1 %
BILIRUB SERPL-MCNC: 0.5 MG/DL (ref 0.2–1)
BUN SERPL-MCNC: 18 MG/DL (ref 6–20)
BUN/CREAT SERPL: 15 (ref 7–25)
CALCIUM SERPL-MCNC: 9 MG/DL (ref 8.4–10.2)
CASE RPRT: NORMAL
CHLORIDE SERPL-SCNC: 106 MMOL/L (ref 98–107)
CLINICAL INFO: NORMAL
CO2 SERPL-SCNC: 28 MMOL/L (ref 21–32)
CREAT SERPL-MCNC: 1.22 MG/DL (ref 0.67–1.17)
DEPRECATED RDW RBC: 42.1 FL (ref 39–50)
EOSINOPHIL # BLD: 0 K/MCL (ref 0–0.5)
EOSINOPHIL NFR BLD: 1 %
ERYTHROCYTE [DISTWIDTH] IN BLOOD: 13.4 % (ref 11–15)
FASTING DURATION TIME PATIENT: ABNORMAL H
GFR SERPLBLD BASED ON 1.73 SQ M-ARVRAT: 66 ML/MIN/1.73M2
GLOBULIN SER-MCNC: 3.1 G/DL (ref 2–4)
GLUCOSE SERPL-MCNC: 88 MG/DL (ref 65–99)
HCT VFR BLD CALC: 41.4 % (ref 39–51)
HGB BLD-MCNC: 13.4 G/DL (ref 13–17)
IMM GRANULOCYTES # BLD AUTO: 0 K/MCL (ref 0–0.2)
IMM GRANULOCYTES # BLD: 0 %
LYMPHOCYTES # BLD: 1.8 K/MCL (ref 1–4)
LYMPHOCYTES NFR BLD: 49 %
MCH RBC QN AUTO: 27.7 PG (ref 26–34)
MCHC RBC AUTO-ENTMCNC: 32.4 G/DL (ref 32–36.5)
MCV RBC AUTO: 85.7 FL (ref 78–100)
MONOCYTES # BLD: 0.5 K/MCL (ref 0.3–0.9)
MONOCYTES NFR BLD: 13 %
NEUTROPHILS # BLD: 1.3 K/MCL (ref 1.8–7.7)
NEUTROPHILS NFR BLD: 36 %
NRBC BLD MANUAL-RTO: 0 /100 WBC
PATH REPORT.FINAL DX SPEC: NORMAL
PATH REPORT.FINAL DX SPEC: NORMAL
PATH REPORT.GROSS SPEC: NORMAL
PLATELET # BLD AUTO: 214 K/MCL (ref 140–450)
POTASSIUM SERPL-SCNC: 3.9 MMOL/L (ref 3.4–5.1)
PROT SERPL-MCNC: 6.9 G/DL (ref 6.4–8.2)
RBC # BLD: 4.83 MIL/MCL (ref 4.5–5.9)
SODIUM SERPL-SCNC: 139 MMOL/L (ref 135–145)
WBC # BLD: 3.7 K/MCL (ref 4.2–11)

## 2021-06-18 PROCEDURE — 80053 COMPREHEN METABOLIC PANEL: CPT | Performed by: CLINICAL MEDICAL LABORATORY

## 2021-06-18 PROCEDURE — 85025 COMPLETE CBC W/AUTO DIFF WBC: CPT | Performed by: CLINICAL MEDICAL LABORATORY

## 2021-06-18 PROCEDURE — 36415 COLL VENOUS BLD VENIPUNCTURE: CPT | Performed by: CLINICAL MEDICAL LABORATORY

## 2021-06-21 ENCOUNTER — HOSPITAL ENCOUNTER (OUTPATIENT)
Dept: CT IMAGING | Age: 76
Discharge: HOME | End: 2021-06-21
Attending: INTERNAL MEDICINE

## 2021-06-21 DIAGNOSIS — K63.89 COLONIC MASS: ICD-10-CM

## 2021-06-21 PROCEDURE — 74178 CT ABD&PLV WO CNTR FLWD CNTR: CPT

## 2021-06-21 PROCEDURE — 10002805 HB CONTRAST AGENT: Performed by: INTERNAL MEDICINE

## 2021-06-21 RX ADMIN — IOHEXOL 97 ML: 300 INJECTION, SOLUTION INTRAVENOUS at 11:29

## 2021-06-23 ENCOUNTER — TELEPHONE (OUTPATIENT)
Dept: GASTROENTEROLOGY | Age: 76
End: 2021-06-23

## 2021-06-23 DIAGNOSIS — R16.0 LIVER MASS: Primary | ICD-10-CM

## 2021-06-29 ENCOUNTER — TELEPHONE (OUTPATIENT)
Dept: GASTROENTEROLOGY | Age: 76
End: 2021-06-29

## 2021-07-16 ENCOUNTER — ANESTHESIA EVENT (OUTPATIENT)
Dept: ADMINISTRATIVE | Age: 76
End: 2021-07-16

## 2021-07-16 ENCOUNTER — ANESTHESIA (OUTPATIENT)
Dept: ADMINISTRATIVE | Age: 76
End: 2021-07-16

## 2021-07-16 ENCOUNTER — HOSPITAL ENCOUNTER (OUTPATIENT)
Dept: ADMINISTRATIVE | Age: 76
Discharge: HOME | End: 2021-07-16
Attending: INTERNAL MEDICINE

## 2021-07-16 VITALS
SYSTOLIC BLOOD PRESSURE: 157 MMHG | HEART RATE: 61 BPM | WEIGHT: 210 LBS | OXYGEN SATURATION: 98 % | RESPIRATION RATE: 17 BRPM | DIASTOLIC BLOOD PRESSURE: 99 MMHG | BODY MASS INDEX: 26.11 KG/M2 | TEMPERATURE: 97.3 F | HEIGHT: 75 IN

## 2021-07-16 DIAGNOSIS — Z86.010 PERSONAL HISTORY OF COLONIC POLYPS: ICD-10-CM

## 2021-07-16 DIAGNOSIS — D12.2 BENIGN NEOPLASM OF ASCENDING COLON: ICD-10-CM

## 2021-07-16 PROCEDURE — 10002801 HB RX 250 W/O HCPCS: Performed by: ANESTHESIOLOGY

## 2021-07-16 PROCEDURE — 10006027 HB SUPPLY 278

## 2021-07-16 PROCEDURE — 13000008 HB ANESTHESIA MAC OUTSIDE OR

## 2021-07-16 PROCEDURE — 10006023 HB SUPPLY 272

## 2021-07-16 PROCEDURE — 88305 TISSUE EXAM BY PATHOLOGIST: CPT | Performed by: INTERNAL MEDICINE

## 2021-07-16 PROCEDURE — 13000029 HB GI MAJOR COMPLEX CASE EA ADD MINUTE

## 2021-07-16 PROCEDURE — 10002800 HB RX 250 W HCPCS: Performed by: ANESTHESIOLOGY

## 2021-07-16 PROCEDURE — 13000028 HB GI MAJOR COMPLEX CASE S/U + 1ST 15 MIN

## 2021-07-16 PROCEDURE — 13000001 HB PHASE II RECOVERY EA 30 MINUTES

## 2021-07-16 PROCEDURE — 10002807 HB RX 258: Performed by: ANESTHESIOLOGY

## 2021-07-16 RX ORDER — SODIUM CHLORIDE 9 MG/ML
INJECTION, SOLUTION INTRAVENOUS CONTINUOUS PRN
Status: DISCONTINUED | OUTPATIENT
Start: 2021-07-16 | End: 2021-07-16

## 2021-07-16 RX ORDER — PROPOFOL 10 MG/ML
INJECTION, EMULSION INTRAVENOUS PRN
Status: DISCONTINUED | OUTPATIENT
Start: 2021-07-16 | End: 2021-07-16

## 2021-07-16 RX ORDER — LIDOCAINE HYDROCHLORIDE 20 MG/ML
INJECTION, SOLUTION INFILTRATION; PERINEURAL PRN
Status: DISCONTINUED | OUTPATIENT
Start: 2021-07-16 | End: 2021-07-16

## 2021-07-16 RX ADMIN — PROPOFOL 50 MG: 10 INJECTION, EMULSION INTRAVENOUS at 15:31

## 2021-07-16 RX ADMIN — PROPOFOL 75 MCG/KG/MIN: 10 INJECTION, EMULSION INTRAVENOUS at 15:31

## 2021-07-16 RX ADMIN — LIDOCAINE HYDROCHLORIDE 4 ML: 20 INJECTION, SOLUTION INFILTRATION; PERINEURAL at 15:31

## 2021-07-16 RX ADMIN — SODIUM CHLORIDE: 9 INJECTION, SOLUTION INTRAVENOUS at 15:30

## 2021-07-16 ASSESSMENT — PAIN SCALES - GENERAL
PAINLEVEL_OUTOF10: 0

## 2021-07-16 ASSESSMENT — ENCOUNTER SYMPTOMS: EXERCISE TOLERANCE: GOOD (>4 METS)

## 2021-07-21 LAB
ASR DISCLAIMER: NORMAL
CASE RPRT: NORMAL
CLINICAL INFO: NORMAL
PATH REPORT.FINAL DX SPEC: NORMAL
PATH REPORT.GROSS SPEC: NORMAL

## 2021-07-28 ENCOUNTER — TELEPHONE (OUTPATIENT)
Dept: SCHEDULING | Age: 76
End: 2021-07-28

## 2021-07-28 ENCOUNTER — OFFICE VISIT (OUTPATIENT)
Dept: PODIATRY CLINIC | Facility: CLINIC | Age: 76
End: 2021-07-28
Payer: MEDICARE

## 2021-07-28 VITALS — HEIGHT: 75 IN | WEIGHT: 210 LBS | BODY MASS INDEX: 26.11 KG/M2

## 2021-07-28 DIAGNOSIS — M79.674 PAIN OF TOE OF RIGHT FOOT: Primary | ICD-10-CM

## 2021-07-28 DIAGNOSIS — B35.1 ONYCHOMYCOSIS: ICD-10-CM

## 2021-07-28 PROCEDURE — 11720 DEBRIDE NAIL 1-5: CPT | Performed by: PODIATRIST

## 2021-07-28 RX ORDER — METOCLOPRAMIDE 10 MG/1
TABLET ORAL
COMMUNITY
Start: 2021-06-29 | End: 2022-01-19

## 2021-07-28 RX ORDER — METOCLOPRAMIDE 10 MG/1
TABLET ORAL
COMMUNITY
Start: 2021-06-29 | End: 2021-07-28 | Stop reason: ALTCHOICE

## 2021-07-28 NOTE — PROGRESS NOTES
HPI:    Patient ID: Dank Garcia is a 66year old male. 57-year-old male presents with recurrent frustrations associated with the toenails of his right foot.   There remains an interesting for frustration that patient has the nails of the right foot sign keratosis. No ulcerations or infections were noted. I anticipate relief. He has mild swelling of the left lower leg and ankle area that I cannot account for I encouraged him to follow-up with primary care.   See patient as symptoms redevelop       ASSESS

## 2021-07-29 ENCOUNTER — OFFICE VISIT (OUTPATIENT)
Dept: FAMILY MEDICINE | Age: 76
End: 2021-07-29

## 2021-07-29 ENCOUNTER — TELEPHONE (OUTPATIENT)
Dept: FAMILY MEDICINE | Age: 76
End: 2021-07-29

## 2021-07-29 ENCOUNTER — IMAGING SERVICES (OUTPATIENT)
Dept: GENERAL RADIOLOGY | Age: 76
End: 2021-07-29
Attending: FAMILY MEDICINE

## 2021-07-29 VITALS
SYSTOLIC BLOOD PRESSURE: 136 MMHG | BODY MASS INDEX: 26.61 KG/M2 | DIASTOLIC BLOOD PRESSURE: 70 MMHG | HEIGHT: 75 IN | HEART RATE: 63 BPM | RESPIRATION RATE: 16 BRPM | TEMPERATURE: 97.9 F | OXYGEN SATURATION: 99 % | WEIGHT: 214 LBS

## 2021-07-29 DIAGNOSIS — M25.472 LEFT ANKLE SWELLING: ICD-10-CM

## 2021-07-29 DIAGNOSIS — M25.472 LEFT ANKLE SWELLING: Primary | ICD-10-CM

## 2021-07-29 DIAGNOSIS — E27.8 ADRENAL MASS, LEFT (CMD): ICD-10-CM

## 2021-07-29 PROCEDURE — 73610 X-RAY EXAM OF ANKLE: CPT | Performed by: RADIOLOGY

## 2021-07-29 PROCEDURE — 99214 OFFICE O/P EST MOD 30 MIN: CPT | Performed by: FAMILY MEDICINE

## 2021-07-29 ASSESSMENT — PATIENT HEALTH QUESTIONNAIRE - PHQ9
CLINICAL INTERPRETATION OF PHQ2 SCORE: NO FURTHER SCREENING NEEDED
CLINICAL INTERPRETATION OF PHQ9 SCORE: NO FURTHER SCREENING NEEDED
SUM OF ALL RESPONSES TO PHQ9 QUESTIONS 1 AND 2: 0
SUM OF ALL RESPONSES TO PHQ9 QUESTIONS 1 AND 2: 0
2. FEELING DOWN, DEPRESSED OR HOPELESS: NOT AT ALL
1. LITTLE INTEREST OR PLEASURE IN DOING THINGS: NOT AT ALL

## 2021-07-29 ASSESSMENT — PAIN SCALES - GENERAL: PAINLEVEL: 0

## 2021-07-29 ASSESSMENT — ENCOUNTER SYMPTOMS
PSYCHIATRIC NEGATIVE: 1
CONSTITUTIONAL NEGATIVE: 1
GASTROINTESTINAL NEGATIVE: 1
NEUROLOGICAL NEGATIVE: 1
RESPIRATORY NEGATIVE: 1

## 2021-08-04 ENCOUNTER — CLINICAL ABSTRACT (OUTPATIENT)
Dept: HEALTH INFORMATION MANAGEMENT | Age: 76
End: 2021-08-04

## 2021-08-12 DIAGNOSIS — N52.9 ERECTILE DYSFUNCTION, UNSPECIFIED ERECTILE DYSFUNCTION TYPE: ICD-10-CM

## 2021-08-13 RX ORDER — TADALAFIL 5 MG/1
5 TABLET ORAL DAILY
Qty: 30 TABLET | Refills: 3 | Status: SHIPPED | OUTPATIENT
Start: 2021-08-13 | End: 2021-08-18

## 2021-08-16 ENCOUNTER — TELEPHONE (OUTPATIENT)
Dept: SCHEDULING | Age: 76
End: 2021-08-16

## 2021-08-18 ENCOUNTER — TELEPHONE (OUTPATIENT)
Dept: SCHEDULING | Age: 76
End: 2021-08-18

## 2021-08-18 DIAGNOSIS — N52.9 ERECTILE DYSFUNCTION, UNSPECIFIED ERECTILE DYSFUNCTION TYPE: ICD-10-CM

## 2021-08-18 RX ORDER — TADALAFIL 5 MG/1
5 TABLET ORAL DAILY
Qty: 30 TABLET | Refills: 3 | Status: SHIPPED | OUTPATIENT
Start: 2021-08-18 | End: 2022-01-04

## 2021-08-19 DIAGNOSIS — J30.9 ALLERGIC RHINITIS, UNSPECIFIED SEASONALITY, UNSPECIFIED TRIGGER: ICD-10-CM

## 2021-08-19 RX ORDER — FLUTICASONE PROPIONATE 50 MCG
SPRAY, SUSPENSION (ML) NASAL
Qty: 16 G | Refills: 3 | Status: SHIPPED | OUTPATIENT
Start: 2021-08-19 | End: 2022-01-08

## 2021-09-08 ENCOUNTER — TELEPHONE (OUTPATIENT)
Dept: PODIATRY CLINIC | Facility: CLINIC | Age: 76
End: 2021-09-08

## 2021-09-08 NOTE — TELEPHONE ENCOUNTER
Patient is scheduled for 09/22/21 and wanted to see if he can come in a little later that day. Next available for follow up was 10/01/21. Patient will not be available to talk till after 1pm today.   Please advise

## 2021-09-08 NOTE — TELEPHONE ENCOUNTER
Yes, pt may be scheduled later in the day on 09/22/21. You may use one of the consult slots. Tasking to phone room -- Please schedule pt later in the day on 09/22/21. Thank you.

## 2021-09-22 ENCOUNTER — OFFICE VISIT (OUTPATIENT)
Dept: PODIATRY CLINIC | Facility: CLINIC | Age: 76
End: 2021-09-22
Payer: MEDICARE

## 2021-09-22 DIAGNOSIS — B35.1 ONYCHOMYCOSIS: ICD-10-CM

## 2021-09-22 DIAGNOSIS — M79.674 PAIN OF TOE OF RIGHT FOOT: Primary | ICD-10-CM

## 2021-09-22 PROCEDURE — 11720 DEBRIDE NAIL 1-5: CPT | Performed by: PODIATRIST

## 2021-09-22 NOTE — PROGRESS NOTES
HPI:    Patient ID: Ty Garcia is a 66year old male. 57-year-old male presents to the office today with recurrent symptoms associated with the toenails of his right foot. He reports relief by previous treatment.     ROS:   I did go over medical sta symptoms redevelop         ASSESSMENT/PLAN:   Pain of toe of right foot  (primary encounter diagnosis)  Onychomycosis    No orders of the defined types were placed in this encounter.       Meds This Visit:  Requested Prescriptions      No prescriptions requ

## 2021-09-25 ENCOUNTER — TELEPHONE (OUTPATIENT)
Dept: SCHEDULING | Age: 76
End: 2021-09-25

## 2021-10-05 ENCOUNTER — OFFICE VISIT (OUTPATIENT)
Dept: ENDOCRINOLOGY | Age: 76
End: 2021-10-05

## 2021-10-05 ENCOUNTER — TELEPHONE (OUTPATIENT)
Dept: SCHEDULING | Age: 76
End: 2021-10-05

## 2021-10-05 VITALS
DIASTOLIC BLOOD PRESSURE: 82 MMHG | HEIGHT: 75 IN | WEIGHT: 210.98 LBS | SYSTOLIC BLOOD PRESSURE: 150 MMHG | HEART RATE: 64 BPM | BODY MASS INDEX: 26.23 KG/M2 | TEMPERATURE: 97.3 F

## 2021-10-05 DIAGNOSIS — R73.03 PREDIABETES: ICD-10-CM

## 2021-10-05 DIAGNOSIS — E27.8 ADRENAL MASS, LEFT (CMD): Primary | ICD-10-CM

## 2021-10-05 PROCEDURE — 99204 OFFICE O/P NEW MOD 45 MIN: CPT | Performed by: INTERNAL MEDICINE

## 2021-10-05 RX ORDER — DEXAMETHASONE 1 MG
1 TABLET ORAL ONCE
Qty: 1 TABLET | Refills: 0 | Status: SHIPPED | OUTPATIENT
Start: 2021-10-05 | End: 2021-10-05

## 2021-10-05 ASSESSMENT — PATIENT HEALTH QUESTIONNAIRE - PHQ9
1. LITTLE INTEREST OR PLEASURE IN DOING THINGS: NOT AT ALL
2. FEELING DOWN, DEPRESSED OR HOPELESS: NOT AT ALL
CLINICAL INTERPRETATION OF PHQ9 SCORE: NO FURTHER SCREENING NEEDED
SUM OF ALL RESPONSES TO PHQ9 QUESTIONS 1 AND 2: 0
CLINICAL INTERPRETATION OF PHQ2 SCORE: NO FURTHER SCREENING NEEDED
SUM OF ALL RESPONSES TO PHQ9 QUESTIONS 1 AND 2: 0

## 2021-10-05 ASSESSMENT — PAIN SCALES - GENERAL: PAINLEVEL: 0

## 2021-10-08 ENCOUNTER — APPOINTMENT (OUTPATIENT)
Dept: LAB | Age: 76
End: 2021-10-08

## 2021-10-15 DIAGNOSIS — J30.9 ALLERGIC RHINITIS, UNSPECIFIED SEASONALITY, UNSPECIFIED TRIGGER: ICD-10-CM

## 2021-10-16 RX ORDER — FEXOFENADINE HYDROCHLORIDE 180 MG/1
TABLET, FILM COATED ORAL
Qty: 90 TABLET | Refills: 0 | Status: SHIPPED | OUTPATIENT
Start: 2021-10-16 | End: 2022-01-17

## 2021-11-08 ENCOUNTER — LAB SERVICES (OUTPATIENT)
Dept: LAB | Age: 76
End: 2021-11-08

## 2021-11-08 DIAGNOSIS — R73.03 PREDIABETES: ICD-10-CM

## 2021-11-08 DIAGNOSIS — E27.8 ADRENAL MASS, LEFT (CMD): ICD-10-CM

## 2021-11-08 LAB
CORTIS P 1 MG DEX SERPL-MCNC: 1.4 MCG/DL
HBA1C MFR BLD: 5.9 % (ref 4.5–5.6)

## 2021-11-08 PROCEDURE — 36415 COLL VENOUS BLD VENIPUNCTURE: CPT | Performed by: CLINICAL MEDICAL LABORATORY

## 2021-11-08 PROCEDURE — 82088 ASSAY OF ALDOSTERONE: CPT | Performed by: CLINICAL MEDICAL LABORATORY

## 2021-11-08 PROCEDURE — 83036 HEMOGLOBIN GLYCOSYLATED A1C: CPT | Performed by: CLINICAL MEDICAL LABORATORY

## 2021-11-08 PROCEDURE — 83835 ASSAY OF METANEPHRINES: CPT | Performed by: CLINICAL MEDICAL LABORATORY

## 2021-11-08 PROCEDURE — 84244 ASSAY OF RENIN: CPT | Performed by: CLINICAL MEDICAL LABORATORY

## 2021-11-08 PROCEDURE — 82533 TOTAL CORTISOL: CPT | Performed by: CLINICAL MEDICAL LABORATORY

## 2021-11-12 LAB
ALDOST SERPL-MCNC: 23.8 NG/DL
ALDOST/RENIN PLAS-RTO: 29.8 RATIO
ANNOTATION COMMENT IMP: NORMAL
METANEPHS SERPL-SCNC: 0.15 NMOL/L (ref 0–0.49)
NORMETANEPHRINE SERPL-SCNC: 0.36 NMOL/L (ref 0–0.89)
RENIN PLAS-CCNC: 0.8 NG/ML/HR

## 2021-11-17 ENCOUNTER — OFFICE VISIT (OUTPATIENT)
Dept: PODIATRY CLINIC | Facility: CLINIC | Age: 76
End: 2021-11-17
Payer: MEDICARE

## 2021-11-17 DIAGNOSIS — B35.1 ONYCHOMYCOSIS: ICD-10-CM

## 2021-11-17 DIAGNOSIS — M79.674 PAIN OF TOE OF RIGHT FOOT: Primary | ICD-10-CM

## 2021-11-17 PROCEDURE — 11720 DEBRIDE NAIL 1-5: CPT | Performed by: PODIATRIST

## 2021-11-18 NOTE — PROGRESS NOTES
HPI:    Patient ID: Shabnam Godinez is a 64year old male. 53-year-old male presents with recurrent symptoms associated with the toenails of his right foot. The last time I saw this patient was September 22.     ROS:       There are no apparent changes i care if and when symptoms redevelop       ASSESSMENT/PLAN:   Pain of toe of right foot  (primary encounter diagnosis)  Onychomycosis    No orders of the defined types were placed in this encounter.       Meds This Visit:  Requested Prescriptions      No pre

## 2021-11-29 ENCOUNTER — HOSPITAL ENCOUNTER (OUTPATIENT)
Dept: CT IMAGING | Age: 76
Discharge: HOME | End: 2021-11-29
Attending: INTERNAL MEDICINE

## 2021-11-29 DIAGNOSIS — E27.8 ADRENAL MASS, LEFT (CMD): ICD-10-CM

## 2021-11-29 LAB
CREAT SERPL-MCNC: 1.2 MG/DL (ref 0.67–1.17)
GFR SERPLBLD BASED ON 1.73 SQ M-ARVRAT: 67 ML/MIN

## 2021-11-29 PROCEDURE — G1004 CDSM NDSC: HCPCS

## 2021-11-29 PROCEDURE — 82565 ASSAY OF CREATININE: CPT

## 2021-11-29 PROCEDURE — 74170 CT ABD WO CNTRST FLWD CNTRST: CPT

## 2021-11-29 PROCEDURE — 10002805 HB CONTRAST AGENT: Performed by: INTERNAL MEDICINE

## 2021-11-29 RX ADMIN — IOHEXOL 100 ML: 350 INJECTION, SOLUTION INTRAVENOUS at 09:00

## 2021-12-01 ENCOUNTER — TELEPHONE (OUTPATIENT)
Dept: SCHEDULING | Age: 76
End: 2021-12-01

## 2021-12-01 ENCOUNTER — TELEPHONE (OUTPATIENT)
Dept: FAMILY MEDICINE | Age: 76
End: 2021-12-01

## 2021-12-01 DIAGNOSIS — R93.429 ABNORMAL CT SCAN, KIDNEY: Primary | ICD-10-CM

## 2021-12-02 ENCOUNTER — TELEPHONE (OUTPATIENT)
Dept: SCHEDULING | Age: 76
End: 2021-12-02

## 2021-12-15 ENCOUNTER — EXTERNAL RECORD (OUTPATIENT)
Dept: HEALTH INFORMATION MANAGEMENT | Facility: OTHER | Age: 76
End: 2021-12-15

## 2022-01-03 DIAGNOSIS — N52.9 ERECTILE DYSFUNCTION, UNSPECIFIED ERECTILE DYSFUNCTION TYPE: ICD-10-CM

## 2022-01-04 RX ORDER — TADALAFIL 5 MG/1
5 TABLET ORAL DAILY
Qty: 30 TABLET | Refills: 3 | Status: SHIPPED | OUTPATIENT
Start: 2022-01-04 | End: 2022-05-18 | Stop reason: SDUPTHER

## 2022-01-06 NOTE — PROGRESS NOTES
HPI:    Patient ID: Mackenzie Mitchell is a 66year old male. 20-year-old male presents with frustration associated particularly with the second toe of the right foot. I have been caring for this patient on a consistent and regular basis.   He has a good unde
Sandra Grider

## 2022-01-08 DIAGNOSIS — J30.9 ALLERGIC RHINITIS, UNSPECIFIED SEASONALITY, UNSPECIFIED TRIGGER: ICD-10-CM

## 2022-01-08 RX ORDER — FLUTICASONE PROPIONATE 50 MCG
SPRAY, SUSPENSION (ML) NASAL
Qty: 16 G | Refills: 3 | Status: SHIPPED | OUTPATIENT
Start: 2022-01-08 | End: 2022-06-16

## 2022-01-12 ENCOUNTER — TELEPHONE (OUTPATIENT)
Dept: FAMILY MEDICINE | Age: 77
End: 2022-01-12

## 2022-01-12 DIAGNOSIS — N40.1 BENIGN PROSTATIC HYPERPLASIA WITH LOWER URINARY TRACT SYMPTOMS, SYMPTOM DETAILS UNSPECIFIED: ICD-10-CM

## 2022-01-12 DIAGNOSIS — Z01.818 PREOP TESTING: Primary | ICD-10-CM

## 2022-01-12 DIAGNOSIS — R93.429 ABNORMAL CT SCAN, KIDNEY: ICD-10-CM

## 2022-01-14 ENCOUNTER — LAB SERVICES (OUTPATIENT)
Dept: LAB | Age: 77
End: 2022-01-14

## 2022-01-14 DIAGNOSIS — N40.1 BENIGN PROSTATIC HYPERPLASIA WITH LOWER URINARY TRACT SYMPTOMS, SYMPTOM DETAILS UNSPECIFIED: ICD-10-CM

## 2022-01-14 DIAGNOSIS — Z01.818 PREOP TESTING: ICD-10-CM

## 2022-01-14 DIAGNOSIS — R93.429 ABNORMAL CT SCAN, KIDNEY: ICD-10-CM

## 2022-01-14 LAB
ALBUMIN SERPL-MCNC: 4 G/DL (ref 3.6–5.1)
ALBUMIN/GLOB SERPL: 1.3 {RATIO} (ref 1–2.4)
ALP SERPL-CCNC: 58 UNITS/L (ref 45–117)
ALT SERPL-CCNC: 29 UNITS/L
ANION GAP SERPL CALC-SCNC: 10 MMOL/L (ref 10–20)
APPEARANCE UR: CLEAR
AST SERPL-CCNC: 18 UNITS/L
BASOPHILS # BLD: 0 K/MCL (ref 0–0.3)
BASOPHILS NFR BLD: 1 %
BILIRUB SERPL-MCNC: 0.7 MG/DL (ref 0.2–1)
BILIRUB UR QL STRIP: NEGATIVE
BUN SERPL-MCNC: 14 MG/DL (ref 6–20)
BUN/CREAT SERPL: 11 (ref 7–25)
CALCIUM SERPL-MCNC: 9.3 MG/DL (ref 8.4–10.2)
CHLORIDE SERPL-SCNC: 104 MMOL/L (ref 98–107)
CO2 SERPL-SCNC: 29 MMOL/L (ref 21–32)
COLOR UR: YELLOW
CREAT SERPL-MCNC: 1.24 MG/DL (ref 0.67–1.17)
DEPRECATED RDW RBC: 43.3 FL (ref 39–50)
EOSINOPHIL # BLD: 0.1 K/MCL (ref 0–0.5)
EOSINOPHIL NFR BLD: 1 %
ERYTHROCYTE [DISTWIDTH] IN BLOOD: 13.9 % (ref 11–15)
FASTING DURATION TIME PATIENT: 8 HOURS (ref 0–999)
GFR SERPLBLD BASED ON 1.73 SQ M-ARVRAT: 64 ML/MIN
GLOBULIN SER-MCNC: 3 G/DL (ref 2–4)
GLUCOSE SERPL-MCNC: 101 MG/DL (ref 70–99)
GLUCOSE UR STRIP-MCNC: NEGATIVE MG/DL
HCT VFR BLD CALC: 43.7 % (ref 39–51)
HGB BLD-MCNC: 14.5 G/DL (ref 13–17)
HGB UR QL STRIP: NEGATIVE
IMM GRANULOCYTES # BLD AUTO: 0 K/MCL (ref 0–0.2)
IMM GRANULOCYTES # BLD: 0 %
KETONES UR STRIP-MCNC: NEGATIVE MG/DL
LEUKOCYTE ESTERASE UR QL STRIP: NEGATIVE
LYMPHOCYTES # BLD: 2 K/MCL (ref 1–4)
LYMPHOCYTES NFR BLD: 41 %
MCH RBC QN AUTO: 28.2 PG (ref 26–34)
MCHC RBC AUTO-ENTMCNC: 33.2 G/DL (ref 32–36.5)
MCV RBC AUTO: 85 FL (ref 78–100)
MONOCYTES # BLD: 0.7 K/MCL (ref 0.3–0.9)
MONOCYTES NFR BLD: 15 %
NEUTROPHILS # BLD: 2 K/MCL (ref 1.8–7.7)
NEUTROPHILS NFR BLD: 42 %
NITRITE UR QL STRIP: NEGATIVE
NRBC BLD MANUAL-RTO: 0 /100 WBC
PH UR STRIP: 5 [PH] (ref 5–7)
PLATELET # BLD AUTO: 215 K/MCL (ref 140–450)
POTASSIUM SERPL-SCNC: 4.2 MMOL/L (ref 3.4–5.1)
PROT SERPL-MCNC: 7 G/DL (ref 6.4–8.2)
PROT UR STRIP-MCNC: NEGATIVE MG/DL
RBC # BLD: 5.14 MIL/MCL (ref 4.5–5.9)
SODIUM SERPL-SCNC: 139 MMOL/L (ref 135–145)
SP GR UR STRIP: 1.02 (ref 1–1.03)
UROBILINOGEN UR STRIP-MCNC: 0.2 MG/DL
WBC # BLD: 4.9 K/MCL (ref 4.2–11)

## 2022-01-14 PROCEDURE — 81003 URINALYSIS AUTO W/O SCOPE: CPT | Performed by: CLINICAL MEDICAL LABORATORY

## 2022-01-14 PROCEDURE — 80053 COMPREHEN METABOLIC PANEL: CPT | Performed by: CLINICAL MEDICAL LABORATORY

## 2022-01-14 PROCEDURE — 85025 COMPLETE CBC W/AUTO DIFF WBC: CPT | Performed by: CLINICAL MEDICAL LABORATORY

## 2022-01-14 PROCEDURE — 36415 COLL VENOUS BLD VENIPUNCTURE: CPT | Performed by: CLINICAL MEDICAL LABORATORY

## 2022-01-15 DIAGNOSIS — J30.9 ALLERGIC RHINITIS, UNSPECIFIED SEASONALITY, UNSPECIFIED TRIGGER: ICD-10-CM

## 2022-01-17 RX ORDER — FEXOFENADINE HYDROCHLORIDE 180 MG/1
TABLET, FILM COATED ORAL
Qty: 90 TABLET | Refills: 0 | Status: SHIPPED | OUTPATIENT
Start: 2022-01-17 | End: 2023-08-04 | Stop reason: SDUPTHER

## 2022-01-19 ENCOUNTER — OFFICE VISIT (OUTPATIENT)
Dept: PODIATRY CLINIC | Facility: CLINIC | Age: 77
End: 2022-01-19
Payer: MEDICARE

## 2022-01-19 ENCOUNTER — OFFICE VISIT (OUTPATIENT)
Dept: FAMILY MEDICINE | Age: 77
End: 2022-01-19

## 2022-01-19 ENCOUNTER — APPOINTMENT (OUTPATIENT)
Dept: FAMILY MEDICINE | Age: 77
End: 2022-01-19

## 2022-01-19 VITALS
SYSTOLIC BLOOD PRESSURE: 151 MMHG | WEIGHT: 217.4 LBS | HEIGHT: 75 IN | BODY MASS INDEX: 27.03 KG/M2 | OXYGEN SATURATION: 95 % | DIASTOLIC BLOOD PRESSURE: 70 MMHG | RESPIRATION RATE: 18 BRPM | HEART RATE: 60 BPM | TEMPERATURE: 98.6 F

## 2022-01-19 DIAGNOSIS — M79.674 PAIN OF TOE OF RIGHT FOOT: Primary | ICD-10-CM

## 2022-01-19 DIAGNOSIS — I10 PRIMARY HYPERTENSION: Primary | ICD-10-CM

## 2022-01-19 DIAGNOSIS — B35.1 ONYCHOMYCOSIS: ICD-10-CM

## 2022-01-19 DIAGNOSIS — E27.8 ADRENAL MASS, LEFT (CMD): ICD-10-CM

## 2022-01-19 PROCEDURE — 99214 OFFICE O/P EST MOD 30 MIN: CPT | Performed by: FAMILY MEDICINE

## 2022-01-19 PROCEDURE — 11720 DEBRIDE NAIL 1-5: CPT | Performed by: PODIATRIST

## 2022-01-19 RX ORDER — AMLODIPINE BESYLATE 2.5 MG/1
2.5 TABLET ORAL DAILY
Qty: 30 TABLET | Refills: 11 | Status: SHIPPED | OUTPATIENT
Start: 2022-01-19 | End: 2023-01-09

## 2022-01-19 ASSESSMENT — ENCOUNTER SYMPTOMS
GASTROINTESTINAL NEGATIVE: 1
HEMATOLOGIC/LYMPHATIC NEGATIVE: 1
RESPIRATORY NEGATIVE: 1
NEUROLOGICAL NEGATIVE: 1
PSYCHIATRIC NEGATIVE: 1
ENDOCRINE NEGATIVE: 1
CONSTITUTIONAL NEGATIVE: 1

## 2022-01-19 ASSESSMENT — COGNITIVE AND FUNCTIONAL STATUS - GENERAL
DO YOU HAVE DIFFICULTY DRESSING OR BATHING: NO
BECAUSE OF A PHYSICAL, MENTAL, OR EMOTIONAL CONDITION, DO YOU HAVE DIFFICULTY DOING ERRANDS ALONE: NO
BECAUSE OF A PHYSICAL, MENTAL, OR EMOTIONAL CONDITION, DO YOU HAVE SERIOUS DIFFICULTY CONCENTRATING, REMEMBERING OR MAKING DECISIONS: NO
DO YOU HAVE SERIOUS DIFFICULTY WALKING OR CLIMBING STAIRS: NO

## 2022-01-19 ASSESSMENT — PATIENT HEALTH QUESTIONNAIRE - PHQ9
SUM OF ALL RESPONSES TO PHQ9 QUESTIONS 1 AND 2: 0
2. FEELING DOWN, DEPRESSED OR HOPELESS: NOT AT ALL
1. LITTLE INTEREST OR PLEASURE IN DOING THINGS: NOT AT ALL
SUM OF ALL RESPONSES TO PHQ9 QUESTIONS 1 AND 2: 0
CLINICAL INTERPRETATION OF PHQ2 SCORE: NO FURTHER SCREENING NEEDED

## 2022-01-19 NOTE — PROGRESS NOTES
HPI:    Patient ID: Keagan Bowers is a 64year old male. This 22-year-old male presents with recurrent symptoms particularly with the right toenails. The last time I saw this patient was November 17 and he reports relief by care.       ROS:     I went ov well-informed and I expect relief follow-up as needed         ASSESSMENT/PLAN:   Pain of toe of right foot  (primary encounter diagnosis)  Onychomycosis    No orders of the defined types were placed in this encounter.       Meds This Visit:  Requested Presc

## 2022-01-20 DIAGNOSIS — N40.1 BENIGN PROSTATIC HYPERPLASIA WITH LOWER URINARY TRACT SYMPTOMS, SYMPTOM DETAILS UNSPECIFIED: ICD-10-CM

## 2022-01-20 DIAGNOSIS — R93.429 ABNORMAL CT SCAN, KIDNEY: ICD-10-CM

## 2022-01-20 DIAGNOSIS — Z01.818 PREOP TESTING: ICD-10-CM

## 2022-01-20 PROCEDURE — 93000 ELECTROCARDIOGRAM COMPLETE: CPT | Performed by: FAMILY MEDICINE

## 2022-01-31 ENCOUNTER — CLINICAL ABSTRACT (OUTPATIENT)
Dept: HEALTH INFORMATION MANAGEMENT | Facility: OTHER | Age: 77
End: 2022-01-31

## 2022-02-03 ASSESSMENT — ACTIVITIES OF DAILY LIVING (ADL)
ADL_SCORE: 12
ADL_BEFORE_ADMISSION: INDEPENDENT

## 2022-02-05 ENCOUNTER — LAB SERVICES (OUTPATIENT)
Dept: LAB | Age: 77
End: 2022-02-05

## 2022-02-05 ENCOUNTER — HOSPITAL ENCOUNTER (OUTPATIENT)
Dept: LAB | Age: 77
Discharge: HOME | End: 2022-02-05
Attending: SURGERY

## 2022-02-05 DIAGNOSIS — Z01.812 PRE-PROCEDURAL LABORATORY EXAMINATION: ICD-10-CM

## 2022-02-05 LAB
ABO + RH BLD: NORMAL
BLD GP AB SCN SERPL QL GEL: NEGATIVE
TYPE AND SCREEN EXPIRATION DATE: NORMAL

## 2022-02-05 PROCEDURE — U0005 INFEC AGEN DETEC AMPLI PROBE: HCPCS | Performed by: CLINICAL MEDICAL LABORATORY

## 2022-02-05 PROCEDURE — U0003 INFECTIOUS AGENT DETECTION BY NUCLEIC ACID (DNA OR RNA); SEVERE ACUTE RESPIRATORY SYNDROME CORONAVIRUS 2 (SARS-COV-2) (CORONAVIRUS DISEASE [COVID-19]), AMPLIFIED PROBE TECHNIQUE, MAKING USE OF HIGH THROUGHPUT TECHNOLOGIES AS DESCRIBED BY CMS-2020-01-R: HCPCS | Performed by: CLINICAL MEDICAL LABORATORY

## 2022-02-05 PROCEDURE — 86900 BLOOD TYPING SEROLOGIC ABO: CPT | Performed by: SURGERY

## 2022-02-05 PROCEDURE — 36415 COLL VENOUS BLD VENIPUNCTURE: CPT | Performed by: SURGERY

## 2022-02-06 LAB
SARS-COV-2 RNA RESP QL NAA+PROBE: NOT DETECTED
SERVICE CMNT-IMP: NORMAL
SERVICE CMNT-IMP: NORMAL

## 2022-02-07 RX ORDER — 0.9 % SODIUM CHLORIDE 0.9 %
2 VIAL (ML) INJECTION EVERY 12 HOURS SCHEDULED
Status: CANCELLED | OUTPATIENT
Start: 2022-02-08

## 2022-02-08 ENCOUNTER — HOSPITAL ENCOUNTER (OUTPATIENT)
Age: 77
Setting detail: SURGERY ADMIT
Discharge: HOME | End: 2022-02-08
Attending: SURGERY | Admitting: FAMILY MEDICINE

## 2022-02-08 ENCOUNTER — ANESTHESIA EVENT (OUTPATIENT)
Dept: SURGERY | Age: 77
End: 2022-02-08

## 2022-02-08 ENCOUNTER — ANESTHESIA (OUTPATIENT)
Dept: SURGERY | Age: 77
End: 2022-02-08

## 2022-02-08 VITALS
TEMPERATURE: 97.7 F | HEIGHT: 75 IN | OXYGEN SATURATION: 95 % | SYSTOLIC BLOOD PRESSURE: 143 MMHG | DIASTOLIC BLOOD PRESSURE: 73 MMHG | WEIGHT: 210 LBS | BODY MASS INDEX: 26.11 KG/M2 | HEART RATE: 78 BPM | RESPIRATION RATE: 14 BRPM

## 2022-02-08 DIAGNOSIS — N28.89 RIGHT KIDNEY MASS: ICD-10-CM

## 2022-02-08 DIAGNOSIS — Z01.812 PRE-PROCEDURAL LABORATORY EXAMINATION: Primary | ICD-10-CM

## 2022-02-08 PROCEDURE — 10002800 HB RX 250 W HCPCS

## 2022-02-08 PROCEDURE — 10004451 HB PACU RECOVERY 1ST 30 MINUTES: Performed by: SURGERY

## 2022-02-08 PROCEDURE — 13000003 HB ANESTHESIA  GENERAL EA ADD MINUTE: Performed by: SURGERY

## 2022-02-08 PROCEDURE — 10002801 HB RX 250 W/O HCPCS

## 2022-02-08 PROCEDURE — 10002803 HB RX 637: Performed by: SURGERY

## 2022-02-08 PROCEDURE — 10002800 HB RX 250 W HCPCS: Performed by: SURGERY

## 2022-02-08 PROCEDURE — 13000099 HB GENERAL ROBOTIC CASE EA ADD MINUTE: Performed by: SURGERY

## 2022-02-08 PROCEDURE — 10006023 HB SUPPLY 272: Performed by: SURGERY

## 2022-02-08 PROCEDURE — 10002807 HB RX 258: Performed by: ANESTHESIOLOGY

## 2022-02-08 PROCEDURE — 10002800 HB RX 250 W HCPCS: Performed by: NURSE ANESTHETIST, CERTIFIED REGISTERED

## 2022-02-08 PROCEDURE — 88341 IMHCHEM/IMCYTCHM EA ADD ANTB: CPT | Performed by: SURGERY

## 2022-02-08 PROCEDURE — 10002807 HB RX 258: Performed by: NURSE ANESTHETIST, CERTIFIED REGISTERED

## 2022-02-08 PROCEDURE — 13000001 HB PHASE II RECOVERY EA 30 MINUTES: Performed by: SURGERY

## 2022-02-08 PROCEDURE — 10004452 HB PACU ADDL 30 MINUTES: Performed by: SURGERY

## 2022-02-08 PROCEDURE — 10006027 HB SUPPLY 278: Performed by: SURGERY

## 2022-02-08 PROCEDURE — C9290 INJ, BUPIVACAINE LIPOSOME: HCPCS

## 2022-02-08 PROCEDURE — 13000002 HB ANESTHESIA  GENERAL  S/U + 1ST 15 MIN: Performed by: SURGERY

## 2022-02-08 PROCEDURE — 10002801 HB RX 250 W/O HCPCS: Performed by: NURSE ANESTHETIST, CERTIFIED REGISTERED

## 2022-02-08 PROCEDURE — 13000098 HB GENERAL ROBOTIC CASE S/U + 1ST 15 MIN: Performed by: SURGERY

## 2022-02-08 DEVICE — VIOLET POLYDIOXANONE POLYMER, SYNTHETIC ABSORBABLE SUTURE CLIPS
Type: IMPLANTABLE DEVICE | Site: ABDOMEN | Status: FUNCTIONAL
Brand: LAPRA-TY

## 2022-02-08 DEVICE — FLOSEAL HEMOSTATIC MATRIX, 10ML
Type: IMPLANTABLE DEVICE | Site: ABDOMEN | Status: FUNCTIONAL
Brand: FLOSEAL HEMOSTATIC MATRIX

## 2022-02-08 DEVICE — HEMOLOK L 6 CLIPS/CART
Type: IMPLANTABLE DEVICE | Site: ABDOMEN | Status: FUNCTIONAL
Brand: WECK

## 2022-02-08 RX ORDER — GLYCOPYRROLATE 0.2 MG/ML
INJECTION, SOLUTION INTRAMUSCULAR; INTRAVENOUS PRN
Status: DISCONTINUED | OUTPATIENT
Start: 2022-02-08 | End: 2022-02-08

## 2022-02-08 RX ORDER — LIDOCAINE HYDROCHLORIDE 20 MG/ML
10 JELLY TOPICAL PRN
Status: DISCONTINUED | OUTPATIENT
Start: 2022-02-08 | End: 2022-02-08 | Stop reason: HOSPADM

## 2022-02-08 RX ORDER — DOCUSATE SODIUM 100 MG/1
100 CAPSULE, LIQUID FILLED ORAL 2 TIMES DAILY PRN
Qty: 60 CAPSULE | Refills: 0 | Status: SHIPPED | OUTPATIENT
Start: 2022-02-08 | End: 2022-03-30

## 2022-02-08 RX ORDER — HYDRALAZINE HYDROCHLORIDE 20 MG/ML
5 INJECTION INTRAMUSCULAR; INTRAVENOUS EVERY 10 MIN PRN
Status: DISCONTINUED | OUTPATIENT
Start: 2022-02-08 | End: 2022-02-08 | Stop reason: HOSPADM

## 2022-02-08 RX ORDER — SODIUM CHLORIDE, SODIUM LACTATE, POTASSIUM CHLORIDE, CALCIUM CHLORIDE 600; 310; 30; 20 MG/100ML; MG/100ML; MG/100ML; MG/100ML
INJECTION, SOLUTION INTRAVENOUS CONTINUOUS
Status: DISCONTINUED | OUTPATIENT
Start: 2022-02-08 | End: 2022-02-08 | Stop reason: HOSPADM

## 2022-02-08 RX ORDER — METOCLOPRAMIDE HYDROCHLORIDE 5 MG/ML
5 INJECTION INTRAMUSCULAR; INTRAVENOUS EVERY 6 HOURS PRN
Status: DISCONTINUED | OUTPATIENT
Start: 2022-02-08 | End: 2022-02-08 | Stop reason: HOSPADM

## 2022-02-08 RX ORDER — MANNITOL 250 MG/ML
50 INJECTION, SOLUTION INTRAVENOUS ONCE
Status: DISCONTINUED | OUTPATIENT
Start: 2022-02-08 | End: 2022-02-08 | Stop reason: HOSPADM

## 2022-02-08 RX ORDER — TADALAFIL 5 MG/1
5 TABLET ORAL DAILY
Status: DISCONTINUED | OUTPATIENT
Start: 2022-02-08 | End: 2022-02-08 | Stop reason: HOSPADM

## 2022-02-08 RX ORDER — AMLODIPINE BESYLATE 2.5 MG/1
2.5 TABLET ORAL DAILY
Status: DISCONTINUED | OUTPATIENT
Start: 2022-02-08 | End: 2022-02-08 | Stop reason: HOSPADM

## 2022-02-08 RX ORDER — POLYETHYLENE GLYCOL 3350 17 G/17G
17 POWDER, FOR SOLUTION ORAL DAILY
Status: DISCONTINUED | OUTPATIENT
Start: 2022-02-08 | End: 2022-02-08 | Stop reason: HOSPADM

## 2022-02-08 RX ORDER — ONDANSETRON 4 MG/1
4 TABLET, ORALLY DISINTEGRATING ORAL EVERY 12 HOURS PRN
Status: DISCONTINUED | OUTPATIENT
Start: 2022-02-08 | End: 2022-02-08 | Stop reason: HOSPADM

## 2022-02-08 RX ORDER — ONDANSETRON 2 MG/ML
4 INJECTION INTRAMUSCULAR; INTRAVENOUS 2 TIMES DAILY PRN
Status: DISCONTINUED | OUTPATIENT
Start: 2022-02-08 | End: 2022-02-08 | Stop reason: HOSPADM

## 2022-02-08 RX ORDER — SODIUM CHLORIDE 9 MG/ML
INJECTION, SOLUTION INTRAVENOUS CONTINUOUS
Status: DISCONTINUED | OUTPATIENT
Start: 2022-02-08 | End: 2022-02-08 | Stop reason: HOSPADM

## 2022-02-08 RX ORDER — OXYCODONE HYDROCHLORIDE 5 MG/1
5 TABLET ORAL EVERY 4 HOURS PRN
Status: DISCONTINUED | OUTPATIENT
Start: 2022-02-08 | End: 2022-02-08 | Stop reason: HOSPADM

## 2022-02-08 RX ORDER — AMOXICILLIN 250 MG
1 CAPSULE ORAL NIGHTLY
Status: DISCONTINUED | OUTPATIENT
Start: 2022-02-08 | End: 2022-02-08 | Stop reason: HOSPADM

## 2022-02-08 RX ORDER — HYDROCODONE BITARTRATE AND ACETAMINOPHEN 5; 325 MG/1; MG/1
1 TABLET ORAL EVERY 6 HOURS PRN
Qty: 20 TABLET | Refills: 0 | Status: SHIPPED | OUTPATIENT
Start: 2022-02-08 | End: 2022-03-30

## 2022-02-08 RX ORDER — PROCHLORPERAZINE EDISYLATE 5 MG/ML
5 INJECTION INTRAMUSCULAR; INTRAVENOUS EVERY 4 HOURS PRN
Status: DISCONTINUED | OUTPATIENT
Start: 2022-02-08 | End: 2022-02-08 | Stop reason: HOSPADM

## 2022-02-08 RX ORDER — GABAPENTIN 300 MG/1
300 CAPSULE ORAL EVERY 8 HOURS SCHEDULED
Status: DISCONTINUED | OUTPATIENT
Start: 2022-02-08 | End: 2022-02-08 | Stop reason: HOSPADM

## 2022-02-08 RX ORDER — TRIAMTERENE AND HYDROCHLOROTHIAZIDE 37.5; 25 MG/1; MG/1
1 TABLET ORAL DAILY
Status: DISCONTINUED | OUTPATIENT
Start: 2022-02-08 | End: 2022-02-08 | Stop reason: HOSPADM

## 2022-02-08 RX ORDER — ACETAMINOPHEN 500 MG
1000 TABLET ORAL EVERY 8 HOURS SCHEDULED
Status: DISCONTINUED | OUTPATIENT
Start: 2022-02-08 | End: 2022-02-08 | Stop reason: HOSPADM

## 2022-02-08 RX ORDER — KETOROLAC TROMETHAMINE 10 MG/1
10 TABLET, FILM COATED ORAL EVERY 6 HOURS PRN
Qty: 20 TABLET | Refills: 0 | Status: SHIPPED | OUTPATIENT
Start: 2022-02-08 | End: 2022-03-30

## 2022-02-08 RX ORDER — DEXAMETHASONE SODIUM PHOSPHATE 4 MG/ML
INJECTION, SOLUTION INTRA-ARTICULAR; INTRALESIONAL; INTRAMUSCULAR; INTRAVENOUS; SOFT TISSUE PRN
Status: DISCONTINUED | OUTPATIENT
Start: 2022-02-08 | End: 2022-02-08

## 2022-02-08 RX ORDER — CHLORHEXIDINE GLUCONATE ORAL RINSE 1.2 MG/ML
15 SOLUTION DENTAL EVERY 12 HOURS SCHEDULED
Status: DISCONTINUED | OUTPATIENT
Start: 2022-02-08 | End: 2022-02-08 | Stop reason: HOSPADM

## 2022-02-08 RX ORDER — ONDANSETRON 2 MG/ML
4 INJECTION INTRAMUSCULAR; INTRAVENOUS EVERY 12 HOURS PRN
Status: DISCONTINUED | OUTPATIENT
Start: 2022-02-08 | End: 2022-02-08 | Stop reason: HOSPADM

## 2022-02-08 RX ORDER — SODIUM CHLORIDE, SODIUM LACTATE, POTASSIUM CHLORIDE, CALCIUM CHLORIDE 600; 310; 30; 20 MG/100ML; MG/100ML; MG/100ML; MG/100ML
INJECTION, SOLUTION INTRAVENOUS CONTINUOUS PRN
Status: DISCONTINUED | OUTPATIENT
Start: 2022-02-08 | End: 2022-02-08

## 2022-02-08 RX ORDER — MONTELUKAST SODIUM 10 MG/1
10 TABLET ORAL DAILY
Status: DISCONTINUED | OUTPATIENT
Start: 2022-02-08 | End: 2022-02-08 | Stop reason: HOSPADM

## 2022-02-08 RX ORDER — ROCURONIUM BROMIDE 10 MG/ML
INJECTION, SOLUTION INTRAVENOUS PRN
Status: DISCONTINUED | OUTPATIENT
Start: 2022-02-08 | End: 2022-02-08

## 2022-02-08 RX ORDER — ONDANSETRON 2 MG/ML
INJECTION INTRAMUSCULAR; INTRAVENOUS PRN
Status: DISCONTINUED | OUTPATIENT
Start: 2022-02-08 | End: 2022-02-08

## 2022-02-08 RX ORDER — SODIUM CHLORIDE 9 MG/ML
INJECTION, SOLUTION INTRAVENOUS CONTINUOUS PRN
Status: DISCONTINUED | OUTPATIENT
Start: 2022-02-08 | End: 2022-02-08

## 2022-02-08 RX ORDER — DIPHENHYDRAMINE HYDROCHLORIDE 50 MG/ML
25 INJECTION INTRAMUSCULAR; INTRAVENOUS
Status: DISCONTINUED | OUTPATIENT
Start: 2022-02-08 | End: 2022-02-08 | Stop reason: HOSPADM

## 2022-02-08 RX ORDER — LIDOCAINE HYDROCHLORIDE 20 MG/ML
INJECTION, SOLUTION INFILTRATION; PERINEURAL PRN
Status: DISCONTINUED | OUTPATIENT
Start: 2022-02-08 | End: 2022-02-08

## 2022-02-08 RX ORDER — EPHEDRINE SULFATE/0.9% NACL/PF 50 MG/10ML
5 SYRINGE (ML) INTRAVENOUS
Status: DISCONTINUED | OUTPATIENT
Start: 2022-02-08 | End: 2022-02-08 | Stop reason: HOSPADM

## 2022-02-08 RX ORDER — NALOXONE HCL 0.4 MG/ML
0.2 VIAL (ML) INJECTION EVERY 5 MIN PRN
Status: DISCONTINUED | OUTPATIENT
Start: 2022-02-08 | End: 2022-02-08 | Stop reason: HOSPADM

## 2022-02-08 RX ORDER — NEOSTIGMINE METHYLSULFATE 4 MG/4 ML
SYRINGE (ML) INTRAVENOUS PRN
Status: DISCONTINUED | OUTPATIENT
Start: 2022-02-08 | End: 2022-02-08

## 2022-02-08 RX ORDER — MANNITOL 250 MG/ML
INJECTION, SOLUTION INTRAVENOUS PRN
Status: DISCONTINUED | OUTPATIENT
Start: 2022-02-08 | End: 2022-02-08

## 2022-02-08 RX ORDER — 0.9 % SODIUM CHLORIDE 0.9 %
2 VIAL (ML) INJECTION EVERY 12 HOURS SCHEDULED
Status: DISCONTINUED | OUTPATIENT
Start: 2022-02-08 | End: 2022-02-08 | Stop reason: HOSPADM

## 2022-02-08 RX ORDER — EPHEDRINE SULFATE/0.9% NACL/PF 50 MG/10ML
SYRINGE (ML) INTRAVENOUS PRN
Status: DISCONTINUED | OUTPATIENT
Start: 2022-02-08 | End: 2022-02-08

## 2022-02-08 RX ORDER — PROPOFOL 10 MG/ML
INJECTION, EMULSION INTRAVENOUS PRN
Status: DISCONTINUED | OUTPATIENT
Start: 2022-02-08 | End: 2022-02-08

## 2022-02-08 RX ORDER — MIDAZOLAM HYDROCHLORIDE 1 MG/ML
INJECTION, SOLUTION INTRAMUSCULAR; INTRAVENOUS PRN
Status: DISCONTINUED | OUTPATIENT
Start: 2022-02-08 | End: 2022-02-08

## 2022-02-08 RX ADMIN — SODIUM CHLORIDE: 9 INJECTION, SOLUTION INTRAVENOUS at 05:47

## 2022-02-08 RX ADMIN — ROCURONIUM BROMIDE 20 MG: 10 INJECTION INTRAVENOUS at 07:34

## 2022-02-08 RX ADMIN — MIDAZOLAM HYDROCHLORIDE 2 MG: 1 INJECTION, SOLUTION INTRAMUSCULAR; INTRAVENOUS at 07:06

## 2022-02-08 RX ADMIN — SUCCINYLCHOLINE CHLORIDE 140 MG: 20 INJECTION, SOLUTION INTRAMUSCULAR; INTRAVENOUS at 07:34

## 2022-02-08 RX ADMIN — EPHEDRINE SULFATE 10 MG: 5 INJECTION INTRAVENOUS at 08:30

## 2022-02-08 RX ADMIN — FENTANYL CITRATE 25 MCG: 50 INJECTION, SOLUTION INTRAMUSCULAR; INTRAVENOUS at 10:48

## 2022-02-08 RX ADMIN — Medication 4 MG: at 09:44

## 2022-02-08 RX ADMIN — PROPOFOL 150 MG: 10 INJECTION, EMULSION INTRAVENOUS at 07:34

## 2022-02-08 RX ADMIN — SODIUM CHLORIDE, POTASSIUM CHLORIDE, SODIUM LACTATE AND CALCIUM CHLORIDE: 600; 310; 30; 20 INJECTION, SOLUTION INTRAVENOUS at 08:49

## 2022-02-08 RX ADMIN — GLYCOPYRROLATE 0.8 MG: 0.2 INJECTION, SOLUTION INTRAMUSCULAR; INTRAVENOUS at 09:44

## 2022-02-08 RX ADMIN — MANNITOL 12.5 G: 12.5 INJECTION, SOLUTION INTRAVENOUS at 09:02

## 2022-02-08 RX ADMIN — CEFAZOLIN SODIUM 2000 MG: 300 INJECTION, POWDER, LYOPHILIZED, FOR SOLUTION INTRAVENOUS at 08:05

## 2022-02-08 RX ADMIN — FENTANYL CITRATE 25 MCG: 50 INJECTION, SOLUTION INTRAMUSCULAR; INTRAVENOUS at 11:24

## 2022-02-08 RX ADMIN — ROCURONIUM BROMIDE 30 MG: 10 INJECTION INTRAVENOUS at 07:45

## 2022-02-08 RX ADMIN — ONDANSETRON 4 MG: 2 INJECTION INTRAMUSCULAR; INTRAVENOUS at 09:44

## 2022-02-08 RX ADMIN — FENTANYL CITRATE 25 MCG: 50 INJECTION, SOLUTION INTRAMUSCULAR; INTRAVENOUS at 10:53

## 2022-02-08 RX ADMIN — KETOROLAC TROMETHAMINE 15 MG: 30 INJECTION, SOLUTION INTRAMUSCULAR at 10:50

## 2022-02-08 RX ADMIN — ROCURONIUM BROMIDE 10 MG: 10 INJECTION INTRAVENOUS at 08:59

## 2022-02-08 RX ADMIN — EPHEDRINE SULFATE 10 MG: 5 INJECTION INTRAVENOUS at 09:18

## 2022-02-08 RX ADMIN — LIDOCAINE HYDROCHLORIDE 60 MG: 20 INJECTION, SOLUTION INFILTRATION; PERINEURAL at 07:34

## 2022-02-08 RX ADMIN — DEXAMETHASONE SODIUM PHOSPHATE 8 MG: 4 INJECTION, SOLUTION INTRAMUSCULAR; INTRAVENOUS at 07:34

## 2022-02-08 RX ADMIN — BUPIVACAINE 20 ML: 13.3 INJECTION, SUSPENSION, LIPOSOMAL INFILTRATION at 07:11

## 2022-02-08 RX ADMIN — MANNITOL 12.5 G: 12.5 INJECTION, SOLUTION INTRAVENOUS at 09:23

## 2022-02-08 RX ADMIN — SODIUM CHLORIDE: 9 INJECTION, SOLUTION INTRAVENOUS at 09:17

## 2022-02-08 RX ADMIN — CHLORHEXIDINE GLUCONATE 15 ML: 1.2 RINSE ORAL at 05:47

## 2022-02-08 RX ADMIN — FENTANYL CITRATE 25 MCG: 50 INJECTION, SOLUTION INTRAMUSCULAR; INTRAVENOUS at 11:10

## 2022-02-08 RX ADMIN — EPHEDRINE SULFATE 10 MG: 5 INJECTION INTRAVENOUS at 08:15

## 2022-02-08 RX ADMIN — FENTANYL CITRATE 100 MCG: 50 INJECTION, SOLUTION INTRAMUSCULAR; INTRAVENOUS at 07:06

## 2022-02-08 RX ADMIN — SODIUM CHLORIDE: 9 INJECTION, SOLUTION INTRAVENOUS at 07:55

## 2022-02-08 ASSESSMENT — PAIN SCALES - WONG BAKER
WONGBAKER_NUMERICALRESPONSE: 0

## 2022-02-08 ASSESSMENT — ENCOUNTER SYMPTOMS: EXERCISE TOLERANCE: GOOD (>4 METS)

## 2022-02-08 ASSESSMENT — PAIN SCALES - GENERAL
PAINLEVEL_OUTOF10: 5
PAINLEVEL_OUTOF10: 0
PAINLEVEL_OUTOF10: 7
PAINLEVEL_OUTOF10: 5
PAINLEVEL_OUTOF10: 0
PAINLEVEL_OUTOF10: 0
PAINLEVEL_OUTOF10: 5
PAINLEVEL_OUTOF10: 0

## 2022-02-11 LAB
ASR DISCLAIMER: NORMAL
CASE RPRT: NORMAL
CLINICAL INFO: NORMAL
PATH REPORT.FINAL DX SPEC: NORMAL
PATH REPORT.FINAL DX SPEC: NORMAL
PATH REPORT.GROSS SPEC: NORMAL

## 2022-02-16 ENCOUNTER — EXTERNAL RECORD (OUTPATIENT)
Dept: HEALTH INFORMATION MANAGEMENT | Facility: OTHER | Age: 77
End: 2022-02-16

## 2022-03-11 ENCOUNTER — TELEPHONE (OUTPATIENT)
Dept: OTHER | Age: 77
End: 2022-03-11

## 2022-03-16 ENCOUNTER — TELEPHONE (OUTPATIENT)
Dept: OTHER | Age: 77
End: 2022-03-16

## 2022-03-16 ENCOUNTER — OFFICE VISIT (OUTPATIENT)
Dept: PODIATRY CLINIC | Facility: CLINIC | Age: 77
End: 2022-03-16
Payer: MEDICARE

## 2022-03-16 DIAGNOSIS — B35.1 ONYCHOMYCOSIS: ICD-10-CM

## 2022-03-16 DIAGNOSIS — M79.674 PAIN OF TOE OF RIGHT FOOT: Primary | ICD-10-CM

## 2022-03-16 PROCEDURE — 11720 DEBRIDE NAIL 1-5: CPT | Performed by: PODIATRIST

## 2022-03-24 ENCOUNTER — TELEPHONE (OUTPATIENT)
Dept: OTHER | Age: 77
End: 2022-03-24

## 2022-03-24 ENCOUNTER — TELEPHONE (OUTPATIENT)
Dept: SCHEDULING | Age: 77
End: 2022-03-24

## 2022-03-28 ASSESSMENT — PATIENT HEALTH QUESTIONNAIRE - PHQ9
CLINICAL INTERPRETATION OF PHQ2 SCORE: NO FURTHER SCREENING NEEDED
SUM OF ALL RESPONSES TO PHQ9 QUESTIONS 1 AND 2: 0
CLINICAL INTERPRETATION OF PHQ2 SCORE: 0
1. LITTLE INTEREST OR PLEASURE IN DOING THINGS: NOT AT ALL
2. FEELING DOWN, DEPRESSED OR HOPELESS: NOT AT ALL

## 2022-03-29 ENCOUNTER — TELEPHONE (OUTPATIENT)
Dept: SCHEDULING | Age: 77
End: 2022-03-29

## 2022-03-30 ENCOUNTER — OFFICE VISIT (OUTPATIENT)
Dept: FAMILY MEDICINE | Age: 77
End: 2022-03-30

## 2022-03-30 VITALS
HEART RATE: 63 BPM | HEIGHT: 75 IN | OXYGEN SATURATION: 98 % | WEIGHT: 214 LBS | BODY MASS INDEX: 26.61 KG/M2 | RESPIRATION RATE: 16 BRPM | SYSTOLIC BLOOD PRESSURE: 130 MMHG | TEMPERATURE: 98.3 F | DIASTOLIC BLOOD PRESSURE: 82 MMHG

## 2022-03-30 DIAGNOSIS — Z23 NEED FOR PROPHYLACTIC VACCINATION WITH TETANUS-DIPHTHERIA (TD): ICD-10-CM

## 2022-03-30 DIAGNOSIS — Z00.00 MEDICARE ANNUAL WELLNESS VISIT, INITIAL: Primary | ICD-10-CM

## 2022-03-30 DIAGNOSIS — Z00.00 ENCOUNTER FOR MEDICARE ANNUAL WELLNESS EXAM: ICD-10-CM

## 2022-03-30 PROCEDURE — G0439 PPPS, SUBSEQ VISIT: HCPCS | Performed by: FAMILY MEDICINE

## 2022-03-30 PROCEDURE — 99214 OFFICE O/P EST MOD 30 MIN: CPT | Performed by: FAMILY MEDICINE

## 2022-03-30 PROCEDURE — 90714 TD VACC NO PRESV 7 YRS+ IM: CPT | Performed by: FAMILY MEDICINE

## 2022-03-30 PROCEDURE — 90471 IMMUNIZATION ADMIN: CPT | Performed by: FAMILY MEDICINE

## 2022-03-30 RX ORDER — TRIAMCINOLONE ACETONIDE 1 MG/G
CREAM TOPICAL
COMMUNITY
Start: 2022-02-28 | End: 2022-03-30 | Stop reason: SDUPTHER

## 2022-03-30 ASSESSMENT — MINI COG
PATIENT WAS GIVEN REPEAT BACK WORDS FROM VERSION: 3 - VILLAGE KITCHEN BABY
PATIENT ABLE TO REPEAT THE 3 WORDS GIVEN PREVIOUSLY?: WAS ABLE TO REPEAT BACK 3 WORDS CORRECTLY
PATIENT ABLE TO FILL IN THE CLOCK FACE WITH 10 MINUTES PAST 11 O'CLOCK?: YES, CLOCK IS CORRECT
TOTAL SCORE: 5

## 2022-03-30 ASSESSMENT — COGNITIVE AND FUNCTIONAL STATUS - GENERAL
BECAUSE OF A PHYSICAL, MENTAL, OR EMOTIONAL CONDITION, DO YOU HAVE SERIOUS DIFFICULTY CONCENTRATING, REMEMBERING OR MAKING DECISIONS: NO
ARE YOU BLIND OR DO YOU HAVE SERIOUS DIFFICULTY SEEING, EVEN WHEN WEARING GLASSES: NO
ARE YOU DEAF OR DO YOU HAVE SERIOUS DIFFICULTY  HEARING: NO
BECAUSE OF A PHYSICAL, MENTAL, OR EMOTIONAL CONDITION, DO YOU HAVE DIFFICULTY DOING ERRANDS ALONE: NO
DO YOU HAVE DIFFICULTY DRESSING OR BATHING: NO
DO YOU HAVE SERIOUS DIFFICULTY WALKING OR CLIMBING STAIRS: NO

## 2022-03-30 ASSESSMENT — ACTIVITIES OF DAILY LIVING (ADL)
RECENT_DECLINE_ADL: NO
NEEDS_ASSIST: NO
ADL_SCORE: 12
ADL_SHORT_OF_BREATH: NO
ADL_BEFORE_ADMISSION: INDEPENDENT

## 2022-03-30 ASSESSMENT — PAIN SCALES - GENERAL: PAINLEVEL: 0

## 2022-04-04 ENCOUNTER — EXTERNAL RECORD (OUTPATIENT)
Dept: HEALTH INFORMATION MANAGEMENT | Facility: OTHER | Age: 77
End: 2022-04-04

## 2022-04-14 ENCOUNTER — TELEPHONE (OUTPATIENT)
Dept: SCHEDULING | Age: 77
End: 2022-04-14

## 2022-04-14 DIAGNOSIS — N52.9 ERECTILE DYSFUNCTION, UNSPECIFIED ERECTILE DYSFUNCTION TYPE: ICD-10-CM

## 2022-04-14 RX ORDER — TADALAFIL 5 MG/1
5 TABLET ORAL DAILY
Qty: 30 TABLET | Refills: 3 | Status: CANCELLED | OUTPATIENT
Start: 2022-04-14

## 2022-04-18 ENCOUNTER — TELEPHONE (OUTPATIENT)
Dept: SCHEDULING | Age: 77
End: 2022-04-18

## 2022-04-29 ENCOUNTER — HOSPITAL ENCOUNTER (OUTPATIENT)
Dept: LAB | Age: 77
Discharge: HOME | End: 2022-04-29

## 2022-04-29 DIAGNOSIS — D41.01 NEOPLASM OF UNCERTAIN BEHAVIOR OF RIGHT KIDNEY: Primary | ICD-10-CM

## 2022-04-29 DIAGNOSIS — Z80.42 FAMILY HISTORY OF MALIGNANT NEOPLASM OF PROSTATE: ICD-10-CM

## 2022-04-29 DIAGNOSIS — D41.02 NEOPLASM OF UNCERTAIN BEHAVIOR OF LEFT KIDNEY: ICD-10-CM

## 2022-04-29 LAB
BUN SERPL-MCNC: 11 MG/DL (ref 6–20)
BUN/CREAT SERPL: 9 (ref 7–25)
CREAT SERPL-MCNC: 1.18 MG/DL (ref 0.67–1.17)
GFR SERPLBLD BASED ON 1.73 SQ M-ARVRAT: 63 ML/MIN

## 2022-04-29 PROCEDURE — 36415 COLL VENOUS BLD VENIPUNCTURE: CPT | Performed by: SURGERY

## 2022-04-29 PROCEDURE — 84520 ASSAY OF UREA NITROGEN: CPT | Performed by: SURGERY

## 2022-05-04 ENCOUNTER — OFFICE VISIT (OUTPATIENT)
Dept: PODIATRY CLINIC | Facility: CLINIC | Age: 77
End: 2022-05-04
Payer: MEDICARE

## 2022-05-04 ENCOUNTER — HOSPITAL ENCOUNTER (OUTPATIENT)
Dept: CT IMAGING | Age: 77
Discharge: HOME | End: 2022-05-04
Attending: SURGERY

## 2022-05-04 DIAGNOSIS — M79.674 PAIN OF TOE OF RIGHT FOOT: Primary | ICD-10-CM

## 2022-05-04 DIAGNOSIS — B35.1 ONYCHOMYCOSIS: ICD-10-CM

## 2022-05-04 DIAGNOSIS — D41.01 NEOPLASM OF UNCERTAIN BEHAVIOR OF RIGHT KIDNEY: ICD-10-CM

## 2022-05-04 PROCEDURE — 11720 DEBRIDE NAIL 1-5: CPT | Performed by: PODIATRIST

## 2022-05-04 PROCEDURE — 74176 CT ABD & PELVIS W/O CONTRAST: CPT

## 2022-05-18 ENCOUNTER — EXTERNAL RECORD (OUTPATIENT)
Dept: HEALTH INFORMATION MANAGEMENT | Facility: OTHER | Age: 77
End: 2022-05-18

## 2022-05-18 ENCOUNTER — TELEPHONE (OUTPATIENT)
Dept: SCHEDULING | Age: 77
End: 2022-05-18

## 2022-05-18 DIAGNOSIS — N52.9 ERECTILE DYSFUNCTION, UNSPECIFIED ERECTILE DYSFUNCTION TYPE: ICD-10-CM

## 2022-05-19 RX ORDER — TADALAFIL 5 MG/1
5 TABLET ORAL DAILY
Qty: 30 TABLET | Refills: 3 | Status: SHIPPED | OUTPATIENT
Start: 2022-05-19 | End: 2022-10-06 | Stop reason: SDUPTHER

## 2022-06-16 DIAGNOSIS — I10 ESSENTIAL HYPERTENSION: ICD-10-CM

## 2022-06-16 DIAGNOSIS — J30.9 ALLERGIC RHINITIS, UNSPECIFIED SEASONALITY, UNSPECIFIED TRIGGER: ICD-10-CM

## 2022-06-16 RX ORDER — TRIAMTERENE AND HYDROCHLOROTHIAZIDE 37.5; 25 MG/1; MG/1
1 TABLET ORAL DAILY
Qty: 90 TABLET | Refills: 2 | Status: SHIPPED | OUTPATIENT
Start: 2022-06-16 | End: 2022-12-21

## 2022-06-16 RX ORDER — MONTELUKAST SODIUM 10 MG/1
10 TABLET ORAL DAILY
Qty: 90 TABLET | Refills: 3 | Status: SHIPPED | OUTPATIENT
Start: 2022-06-16 | End: 2023-03-18

## 2022-06-16 RX ORDER — FLUTICASONE PROPIONATE 50 MCG
SPRAY, SUSPENSION (ML) NASAL
Qty: 16 G | Refills: 3 | Status: SHIPPED | OUTPATIENT
Start: 2022-06-16 | End: 2022-11-01

## 2022-07-13 ENCOUNTER — OFFICE VISIT (OUTPATIENT)
Dept: PODIATRY CLINIC | Facility: CLINIC | Age: 77
End: 2022-07-13
Payer: MEDICARE

## 2022-07-13 DIAGNOSIS — B35.1 ONYCHOMYCOSIS: ICD-10-CM

## 2022-07-13 DIAGNOSIS — M79.674 PAIN OF TOE OF RIGHT FOOT: Primary | ICD-10-CM

## 2022-07-13 PROCEDURE — 11720 DEBRIDE NAIL 1-5: CPT | Performed by: PODIATRIST

## 2022-09-07 ENCOUNTER — OFFICE VISIT (OUTPATIENT)
Dept: PODIATRY CLINIC | Facility: CLINIC | Age: 77
End: 2022-09-07
Payer: MEDICARE

## 2022-09-07 DIAGNOSIS — M79.674 PAIN OF TOE OF RIGHT FOOT: Primary | ICD-10-CM

## 2022-09-07 DIAGNOSIS — B35.1 ONYCHOMYCOSIS: ICD-10-CM

## 2022-09-07 PROCEDURE — 11720 DEBRIDE NAIL 1-5: CPT | Performed by: PODIATRIST

## 2022-10-04 DIAGNOSIS — N52.9 ERECTILE DYSFUNCTION, UNSPECIFIED ERECTILE DYSFUNCTION TYPE: ICD-10-CM

## 2022-10-04 RX ORDER — TADALAFIL 5 MG/1
TABLET ORAL
Qty: 30 TABLET | Refills: 0 | OUTPATIENT
Start: 2022-10-04

## 2022-10-06 DIAGNOSIS — N52.9 ERECTILE DYSFUNCTION, UNSPECIFIED ERECTILE DYSFUNCTION TYPE: ICD-10-CM

## 2022-10-06 RX ORDER — TADALAFIL 5 MG/1
5 TABLET ORAL DAILY
Qty: 30 TABLET | Refills: 3 | Status: SHIPPED | OUTPATIENT
Start: 2022-10-06 | End: 2023-02-07

## 2022-10-06 RX ORDER — TADALAFIL 5 MG/1
TABLET ORAL
Qty: 30 TABLET | Refills: 0 | OUTPATIENT
Start: 2022-10-06

## 2022-11-01 DIAGNOSIS — J30.9 ALLERGIC RHINITIS, UNSPECIFIED SEASONALITY, UNSPECIFIED TRIGGER: ICD-10-CM

## 2022-11-01 RX ORDER — FLUTICASONE PROPIONATE 50 MCG
SPRAY, SUSPENSION (ML) NASAL
Qty: 16 G | Refills: 3 | Status: SHIPPED | OUTPATIENT
Start: 2022-11-01 | End: 2023-03-30 | Stop reason: SDUPTHER

## 2022-11-21 ENCOUNTER — HOSPITAL ENCOUNTER (OUTPATIENT)
Dept: CT IMAGING | Age: 77
Discharge: HOME | End: 2022-11-21
Attending: SURGERY

## 2022-11-21 DIAGNOSIS — D41.10: ICD-10-CM

## 2022-11-21 DIAGNOSIS — D41.02 NEOPLASM OF UNCERTAIN BEHAVIOR OF LEFT KIDNEY: ICD-10-CM

## 2022-11-21 PROCEDURE — 74176 CT ABD & PELVIS W/O CONTRAST: CPT

## 2022-11-23 ENCOUNTER — EXTERNAL RECORD (OUTPATIENT)
Dept: HEALTH INFORMATION MANAGEMENT | Facility: OTHER | Age: 77
End: 2022-11-23

## 2022-11-30 ENCOUNTER — OFFICE VISIT (OUTPATIENT)
Dept: PODIATRY CLINIC | Facility: CLINIC | Age: 77
End: 2022-11-30
Payer: MEDICARE

## 2022-11-30 DIAGNOSIS — B35.1 ONYCHOMYCOSIS: ICD-10-CM

## 2022-11-30 DIAGNOSIS — M79.674 PAIN OF TOE OF RIGHT FOOT: Primary | ICD-10-CM

## 2022-11-30 PROCEDURE — 11720 DEBRIDE NAIL 1-5: CPT | Performed by: PODIATRIST

## 2022-12-21 DIAGNOSIS — I10 ESSENTIAL HYPERTENSION: ICD-10-CM

## 2022-12-21 RX ORDER — TRIAMTERENE AND HYDROCHLOROTHIAZIDE 37.5; 25 MG/1; MG/1
1 TABLET ORAL DAILY
Qty: 90 TABLET | Refills: 0 | Status: SHIPPED | OUTPATIENT
Start: 2022-12-21 | End: 2023-03-28

## 2023-02-05 DIAGNOSIS — N52.9 ERECTILE DYSFUNCTION, UNSPECIFIED ERECTILE DYSFUNCTION TYPE: ICD-10-CM

## 2023-02-07 RX ORDER — TADALAFIL 5 MG/1
TABLET ORAL
Qty: 30 TABLET | Refills: 0 | Status: SHIPPED | OUTPATIENT
Start: 2023-02-07 | End: 2023-03-12

## 2023-03-09 DIAGNOSIS — N52.9 ERECTILE DYSFUNCTION, UNSPECIFIED ERECTILE DYSFUNCTION TYPE: ICD-10-CM

## 2023-03-12 RX ORDER — TADALAFIL 5 MG/1
TABLET ORAL
Qty: 30 TABLET | Refills: 0 | Status: SHIPPED | OUTPATIENT
Start: 2023-03-12 | End: 2023-03-30 | Stop reason: SDUPTHER

## 2023-03-17 DIAGNOSIS — J30.9 ALLERGIC RHINITIS, UNSPECIFIED SEASONALITY, UNSPECIFIED TRIGGER: ICD-10-CM

## 2023-03-18 RX ORDER — MONTELUKAST SODIUM 10 MG/1
10 TABLET ORAL DAILY
Qty: 90 TABLET | Refills: 3 | Status: SHIPPED | OUTPATIENT
Start: 2023-03-18 | End: 2023-03-30 | Stop reason: SDUPTHER

## 2023-03-23 ASSESSMENT — PATIENT HEALTH QUESTIONNAIRE - PHQ9
CLINICAL INTERPRETATION OF PHQ2 SCORE: 0
1. LITTLE INTEREST OR PLEASURE IN DOING THINGS: NOT AT ALL
2. FEELING DOWN, DEPRESSED OR HOPELESS: NOT AT ALL
CLINICAL INTERPRETATION OF PHQ2 SCORE: NO FURTHER SCREENING NEEDED
SUM OF ALL RESPONSES TO PHQ9 QUESTIONS 1 AND 2: 0

## 2023-03-26 DIAGNOSIS — I10 ESSENTIAL HYPERTENSION: ICD-10-CM

## 2023-03-28 RX ORDER — TRIAMTERENE AND HYDROCHLOROTHIAZIDE 37.5; 25 MG/1; MG/1
1 TABLET ORAL DAILY
Qty: 90 TABLET | Refills: 0 | Status: SHIPPED | OUTPATIENT
Start: 2023-03-28 | End: 2023-03-30 | Stop reason: SDUPTHER

## 2023-03-30 ENCOUNTER — OFFICE VISIT (OUTPATIENT)
Dept: FAMILY MEDICINE | Age: 78
End: 2023-03-30

## 2023-03-30 ENCOUNTER — LAB SERVICES (OUTPATIENT)
Dept: LAB | Age: 78
End: 2023-03-30

## 2023-03-30 VITALS
SYSTOLIC BLOOD PRESSURE: 130 MMHG | BODY MASS INDEX: 26.92 KG/M2 | OXYGEN SATURATION: 99 % | HEART RATE: 58 BPM | HEIGHT: 75 IN | DIASTOLIC BLOOD PRESSURE: 70 MMHG | RESPIRATION RATE: 16 BRPM | WEIGHT: 216.49 LBS | TEMPERATURE: 97.6 F

## 2023-03-30 DIAGNOSIS — N52.9 ERECTILE DYSFUNCTION, UNSPECIFIED ERECTILE DYSFUNCTION TYPE: ICD-10-CM

## 2023-03-30 DIAGNOSIS — Z00.00 ENCOUNTER FOR MEDICARE ANNUAL WELLNESS EXAM: ICD-10-CM

## 2023-03-30 DIAGNOSIS — R73.9 HYPERGLYCEMIA: ICD-10-CM

## 2023-03-30 DIAGNOSIS — R35.1 NOCTURIA: ICD-10-CM

## 2023-03-30 DIAGNOSIS — I10 PRIMARY HYPERTENSION: ICD-10-CM

## 2023-03-30 DIAGNOSIS — E27.8 OTHER SPECIFIED DISORDERS OF ADRENAL GLAND (CMD): ICD-10-CM

## 2023-03-30 DIAGNOSIS — Z00.00 HEALTHCARE MAINTENANCE: ICD-10-CM

## 2023-03-30 DIAGNOSIS — Z12.11 COLON CANCER SCREENING: ICD-10-CM

## 2023-03-30 DIAGNOSIS — Z00.00 HEALTHCARE MAINTENANCE: Primary | ICD-10-CM

## 2023-03-30 DIAGNOSIS — J30.9 ALLERGIC RHINITIS, UNSPECIFIED SEASONALITY, UNSPECIFIED TRIGGER: ICD-10-CM

## 2023-03-30 LAB
ALBUMIN SERPL-MCNC: 4.3 G/DL (ref 3.6–5.1)
ALBUMIN/GLOB SERPL: 1.4 {RATIO} (ref 1–2.4)
ALP SERPL-CCNC: 65 UNITS/L (ref 45–117)
ALT SERPL-CCNC: 31 UNITS/L
ANION GAP SERPL CALC-SCNC: 10 MMOL/L (ref 7–19)
APPEARANCE UR: CLEAR
AST SERPL-CCNC: 18 UNITS/L
BILIRUB SERPL-MCNC: 0.8 MG/DL (ref 0.2–1)
BILIRUB UR QL STRIP: NEGATIVE
BUN SERPL-MCNC: 15 MG/DL (ref 6–20)
BUN/CREAT SERPL: 12 (ref 7–25)
CALCIUM SERPL-MCNC: 9.7 MG/DL (ref 8.4–10.2)
CHLORIDE SERPL-SCNC: 105 MMOL/L (ref 97–110)
CHOLEST SERPL-MCNC: 176 MG/DL
CHOLEST/HDLC SERPL: 3.5 {RATIO}
CO2 SERPL-SCNC: 29 MMOL/L (ref 21–32)
COLOR UR: YELLOW
CREAT SERPL-MCNC: 1.26 MG/DL (ref 0.67–1.17)
DEPRECATED RDW RBC: 40.8 FL (ref 39–50)
ERYTHROCYTE [DISTWIDTH] IN BLOOD: 13.2 % (ref 11–15)
FASTING DURATION TIME PATIENT: ABNORMAL H
FASTING DURATION TIME PATIENT: NORMAL H
GFR SERPLBLD BASED ON 1.73 SQ M-ARVRAT: 58 ML/MIN
GLOBULIN SER-MCNC: 3 G/DL (ref 2–4)
GLUCOSE SERPL-MCNC: 102 MG/DL (ref 70–99)
GLUCOSE UR STRIP-MCNC: NEGATIVE MG/DL
HBA1C MFR BLD: 5.6 % (ref 4.5–5.6)
HCT VFR BLD CALC: 43.8 % (ref 39–51)
HCV AB SER QL: NEGATIVE
HDLC SERPL-MCNC: 50 MG/DL
HGB BLD-MCNC: 14.8 G/DL (ref 13–17)
HGB UR QL STRIP: NEGATIVE
KETONES UR STRIP-MCNC: NEGATIVE MG/DL
LDLC SERPL CALC-MCNC: 99 MG/DL
LEUKOCYTE ESTERASE UR QL STRIP: NEGATIVE
MCH RBC QN AUTO: 28.7 PG (ref 26–34)
MCHC RBC AUTO-ENTMCNC: 33.8 G/DL (ref 32–36.5)
MCV RBC AUTO: 85 FL (ref 78–100)
NITRITE UR QL STRIP: NEGATIVE
NONHDLC SERPL-MCNC: 126 MG/DL
NRBC BLD MANUAL-RTO: 0 /100 WBC
PH UR STRIP: 5.5 [PH] (ref 5–7)
PLATELET # BLD AUTO: 235 K/MCL (ref 140–450)
POTASSIUM SERPL-SCNC: 4 MMOL/L (ref 3.4–5.1)
PROT SERPL-MCNC: 7.3 G/DL (ref 6.4–8.2)
PROT UR STRIP-MCNC: ABNORMAL MG/DL
PSA SERPL-MCNC: 1.3 NG/ML
RBC # BLD: 5.15 MIL/MCL (ref 4.5–5.9)
SODIUM SERPL-SCNC: 140 MMOL/L (ref 135–145)
SP GR UR STRIP: 1.02 (ref 1–1.03)
TRIGL SERPL-MCNC: 133 MG/DL
TSH SERPL-ACNC: 0.63 MCUNITS/ML (ref 0.35–5)
UROBILINOGEN UR STRIP-MCNC: 0.2 MG/DL
WBC # BLD: 4.7 K/MCL (ref 4.2–11)

## 2023-03-30 PROCEDURE — 84153 ASSAY OF PSA TOTAL: CPT | Performed by: INTERNAL MEDICINE

## 2023-03-30 PROCEDURE — 85027 COMPLETE CBC AUTOMATED: CPT | Performed by: INTERNAL MEDICINE

## 2023-03-30 PROCEDURE — 84443 ASSAY THYROID STIM HORMONE: CPT | Performed by: INTERNAL MEDICINE

## 2023-03-30 PROCEDURE — 80053 COMPREHEN METABOLIC PANEL: CPT | Performed by: INTERNAL MEDICINE

## 2023-03-30 PROCEDURE — 3075F SYST BP GE 130 - 139MM HG: CPT | Performed by: FAMILY MEDICINE

## 2023-03-30 PROCEDURE — 36415 COLL VENOUS BLD VENIPUNCTURE: CPT | Performed by: FAMILY MEDICINE

## 2023-03-30 PROCEDURE — 3078F DIAST BP <80 MM HG: CPT | Performed by: FAMILY MEDICINE

## 2023-03-30 PROCEDURE — 99213 OFFICE O/P EST LOW 20 MIN: CPT | Performed by: FAMILY MEDICINE

## 2023-03-30 PROCEDURE — 86803 HEPATITIS C AB TEST: CPT | Performed by: INTERNAL MEDICINE

## 2023-03-30 PROCEDURE — 80061 LIPID PANEL: CPT | Performed by: INTERNAL MEDICINE

## 2023-03-30 PROCEDURE — 83036 HEMOGLOBIN GLYCOSYLATED A1C: CPT | Performed by: INTERNAL MEDICINE

## 2023-03-30 PROCEDURE — 81003 URINALYSIS AUTO W/O SCOPE: CPT | Performed by: INTERNAL MEDICINE

## 2023-03-30 PROCEDURE — G0439 PPPS, SUBSEQ VISIT: HCPCS | Performed by: FAMILY MEDICINE

## 2023-03-30 RX ORDER — TRIAMTERENE AND HYDROCHLOROTHIAZIDE 37.5; 25 MG/1; MG/1
1 TABLET ORAL DAILY
Qty: 90 TABLET | Refills: 0 | Status: SHIPPED | OUTPATIENT
Start: 2023-03-30 | End: 2023-06-24

## 2023-03-30 RX ORDER — MONTELUKAST SODIUM 10 MG/1
10 TABLET ORAL DAILY
Qty: 90 TABLET | Refills: 3 | Status: SHIPPED | OUTPATIENT
Start: 2023-03-30 | End: 2025-09-24

## 2023-03-30 RX ORDER — TADALAFIL 5 MG/1
5 TABLET ORAL DAILY
Qty: 30 TABLET | Refills: 3 | Status: SHIPPED | OUTPATIENT
Start: 2023-03-30 | End: 2023-09-11

## 2023-03-30 RX ORDER — AMLODIPINE BESYLATE 2.5 MG/1
2.5 TABLET ORAL DAILY
Qty: 30 TABLET | Refills: 3 | Status: SHIPPED | OUTPATIENT
Start: 2023-03-30 | End: 2023-07-19 | Stop reason: DRUGHIGH

## 2023-03-30 RX ORDER — FLUTICASONE PROPIONATE 50 MCG
SPRAY, SUSPENSION (ML) NASAL
Qty: 16 G | Refills: 3 | Status: SHIPPED | OUTPATIENT
Start: 2023-03-30 | End: 2023-07-01

## 2023-03-30 ASSESSMENT — COGNITIVE AND FUNCTIONAL STATUS - GENERAL
BECAUSE OF A PHYSICAL, MENTAL, OR EMOTIONAL CONDITION, DO YOU HAVE SERIOUS DIFFICULTY CONCENTRATING, REMEMBERING OR MAKING DECISIONS: NO
DO YOU HAVE DIFFICULTY DRESSING OR BATHING: NO
DO YOU HAVE SERIOUS DIFFICULTY WALKING OR CLIMBING STAIRS: NO
BECAUSE OF A PHYSICAL, MENTAL, OR EMOTIONAL CONDITION, DO YOU HAVE DIFFICULTY DOING ERRANDS ALONE: NO

## 2023-03-30 ASSESSMENT — MINI COG
PATIENT ABLE TO FILL IN THE CLOCK FACE WITH 10 MINUTES PAST 11 O'CLOCK?: YES, CLOCK IS CORRECT
PATIENT WAS GIVEN REPEAT BACK WORDS FROM VERSION: 4 - RIVER NATION FINGER
PATIENT ABLE TO REPEAT THE 3 WORDS GIVEN PREVIOUSLY?: WAS ABLE TO REPEAT BACK 3 WORDS CORRECTLY
TOTAL SCORE: 5

## 2023-03-30 ASSESSMENT — PAIN SCALES - GENERAL: PAINLEVEL: 0

## 2023-03-31 ENCOUNTER — LAB SERVICES (OUTPATIENT)
Dept: LAB | Age: 78
End: 2023-03-31

## 2023-03-31 DIAGNOSIS — Z12.11 COLON CANCER SCREENING: ICD-10-CM

## 2023-03-31 PROCEDURE — 82274 ASSAY TEST FOR BLOOD FECAL: CPT | Performed by: INTERNAL MEDICINE

## 2023-04-01 LAB — HEMOCCULT STL QL: NEGATIVE

## 2023-04-25 ENCOUNTER — EXTERNAL RECORD (OUTPATIENT)
Dept: HEALTH INFORMATION MANAGEMENT | Facility: OTHER | Age: 78
End: 2023-04-25

## 2023-05-03 ENCOUNTER — CLINICAL ABSTRACT (OUTPATIENT)
Dept: HEALTH INFORMATION MANAGEMENT | Facility: OTHER | Age: 78
End: 2023-05-03

## 2023-05-22 ENCOUNTER — HOSPITAL ENCOUNTER (OUTPATIENT)
Dept: ULTRASOUND IMAGING | Age: 78
Discharge: HOME | End: 2023-05-22
Attending: SURGERY

## 2023-05-22 DIAGNOSIS — D41.01 NEOPLASM OF UNCERTAIN BEHAVIOR OF RIGHT KIDNEY: ICD-10-CM

## 2023-05-22 DIAGNOSIS — D41.02 NEOPLASM OF UNCERTAIN BEHAVIOR OF LEFT KIDNEY: ICD-10-CM

## 2023-05-22 PROCEDURE — 76775 US EXAM ABDO BACK WALL LIM: CPT

## 2023-05-31 ENCOUNTER — EXTERNAL RECORD (OUTPATIENT)
Dept: HEALTH INFORMATION MANAGEMENT | Facility: OTHER | Age: 78
End: 2023-05-31

## 2023-06-02 DIAGNOSIS — D41.01 NEOPLASM OF UNCERTAIN BEHAVIOR OF RIGHT KIDNEY: Primary | ICD-10-CM

## 2023-06-02 DIAGNOSIS — D41.02 NEOPLASM OF UNCERTAIN BEHAVIOR OF LEFT KIDNEY: ICD-10-CM

## 2023-06-24 RX ORDER — TRIAMTERENE AND HYDROCHLOROTHIAZIDE 37.5; 25 MG/1; MG/1
1 TABLET ORAL DAILY
Qty: 90 TABLET | Refills: 0 | Status: SHIPPED | OUTPATIENT
Start: 2023-06-24 | End: 2023-07-19 | Stop reason: SDUPTHER

## 2023-07-01 DIAGNOSIS — J30.9 ALLERGIC RHINITIS, UNSPECIFIED SEASONALITY, UNSPECIFIED TRIGGER: ICD-10-CM

## 2023-07-01 RX ORDER — FLUTICASONE PROPIONATE 50 MCG
SPRAY, SUSPENSION (ML) NASAL
Qty: 16 G | Refills: 3 | Status: SHIPPED | OUTPATIENT
Start: 2023-07-01

## 2023-07-03 ENCOUNTER — APPOINTMENT (OUTPATIENT)
Dept: FAMILY MEDICINE | Age: 78
End: 2023-07-03

## 2023-07-10 NOTE — PROGRESS NOTES
HPI:    Patient ID: Mackenzie Mitchell is a 74year old male. 57-year-old male presents with recurrent frustrations associated with the 5 toenails of his right foot. He is able to manage and care for the left foot with no concerns.   He has had relief by prev anticipate relief will see patient for care if and when symptoms redevelop         ASSESSMENT/PLAN:   Pain of toe of right foot  (primary encounter diagnosis)  Onychomycosis    No orders of the defined types were placed in this encounter.       Meds This Vi Post-Care Instructions: I reviewed with the patient in detail post-care instructions. Patient is to keep the biopsy site dry overnight, and then apply bacitracin twice daily until healed. Patient may apply hydrogen peroxide soaks to remove any crusting.

## 2023-07-17 ASSESSMENT — PATIENT HEALTH QUESTIONNAIRE - PHQ9
CLINICAL INTERPRETATION OF PHQ2 SCORE: NO FURTHER SCREENING NEEDED
SUM OF ALL RESPONSES TO PHQ9 QUESTIONS 1 AND 2: 0
2. FEELING DOWN, DEPRESSED OR HOPELESS: NOT AT ALL
1. LITTLE INTEREST OR PLEASURE IN DOING THINGS: NOT AT ALL
CLINICAL INTERPRETATION OF PHQ2 SCORE: 0

## 2023-07-19 ENCOUNTER — LAB SERVICES (OUTPATIENT)
Dept: LAB | Age: 78
End: 2023-07-19

## 2023-07-19 ENCOUNTER — OFFICE VISIT (OUTPATIENT)
Dept: FAMILY MEDICINE | Age: 78
End: 2023-07-19

## 2023-07-19 VITALS
SYSTOLIC BLOOD PRESSURE: 150 MMHG | HEART RATE: 69 BPM | OXYGEN SATURATION: 97 % | HEIGHT: 75 IN | DIASTOLIC BLOOD PRESSURE: 80 MMHG | BODY MASS INDEX: 26.78 KG/M2 | WEIGHT: 215.4 LBS | RESPIRATION RATE: 18 BRPM | TEMPERATURE: 98.2 F

## 2023-07-19 DIAGNOSIS — D30.01 RENAL ONCOCYTOMA OF RIGHT KIDNEY: ICD-10-CM

## 2023-07-19 DIAGNOSIS — N18.31 STAGE 3A CHRONIC KIDNEY DISEASE (CMD): ICD-10-CM

## 2023-07-19 DIAGNOSIS — I10 PRIMARY HYPERTENSION: Primary | ICD-10-CM

## 2023-07-19 PROCEDURE — 99214 OFFICE O/P EST MOD 30 MIN: CPT | Performed by: FAMILY MEDICINE

## 2023-07-19 PROCEDURE — 36415 COLL VENOUS BLD VENIPUNCTURE: CPT | Performed by: FAMILY MEDICINE

## 2023-07-19 PROCEDURE — 3077F SYST BP >= 140 MM HG: CPT | Performed by: FAMILY MEDICINE

## 2023-07-19 PROCEDURE — 3079F DIAST BP 80-89 MM HG: CPT | Performed by: FAMILY MEDICINE

## 2023-07-19 PROCEDURE — 80048 BASIC METABOLIC PNL TOTAL CA: CPT | Performed by: INTERNAL MEDICINE

## 2023-07-19 RX ORDER — AMLODIPINE BESYLATE 5 MG/1
5 TABLET ORAL DAILY
Qty: 90 TABLET | Refills: 0 | Status: SHIPPED | OUTPATIENT
Start: 2023-07-19 | End: 2023-10-27

## 2023-07-19 RX ORDER — TRIAMTERENE AND HYDROCHLOROTHIAZIDE 37.5; 25 MG/1; MG/1
1 TABLET ORAL DAILY
Qty: 90 TABLET | Refills: 0 | Status: SHIPPED | OUTPATIENT
Start: 2023-07-19 | End: 2023-11-10 | Stop reason: SDUPTHER

## 2023-07-19 ASSESSMENT — PATIENT HEALTH QUESTIONNAIRE - PHQ9
2. FEELING DOWN, DEPRESSED OR HOPELESS: NOT AT ALL
CLINICAL INTERPRETATION OF PHQ2 SCORE: NO FURTHER SCREENING NEEDED
SUM OF ALL RESPONSES TO PHQ9 QUESTIONS 1 AND 2: 0
1. LITTLE INTEREST OR PLEASURE IN DOING THINGS: NOT AT ALL
SUM OF ALL RESPONSES TO PHQ9 QUESTIONS 1 AND 2: 0

## 2023-07-19 ASSESSMENT — ENCOUNTER SYMPTOMS
RESPIRATORY NEGATIVE: 1
ENDOCRINE NEGATIVE: 1
CONSTITUTIONAL NEGATIVE: 1
NEUROLOGICAL NEGATIVE: 1
GASTROINTESTINAL NEGATIVE: 1
HEMATOLOGIC/LYMPHATIC NEGATIVE: 1
PSYCHIATRIC NEGATIVE: 1

## 2023-07-19 ASSESSMENT — MINI COG
PATIENT ABLE TO FILL IN THE CLOCK FACE WITH 10 MINUTES PAST 11 O'CLOCK?: YES, CLOCK IS CORRECT
TOTAL SCORE: 5
PATIENT ABLE TO REPEAT THE 3 WORDS GIVEN PREVIOUSLY?: WAS ABLE TO REPEAT BACK 3 WORDS CORRECTLY

## 2023-07-20 LAB
ANION GAP SERPL CALC-SCNC: 10 MMOL/L (ref 7–19)
BUN SERPL-MCNC: 22 MG/DL (ref 6–20)
BUN/CREAT SERPL: 18 (ref 7–25)
CALCIUM SERPL-MCNC: 9.5 MG/DL (ref 8.4–10.2)
CHLORIDE SERPL-SCNC: 108 MMOL/L (ref 97–110)
CO2 SERPL-SCNC: 27 MMOL/L (ref 21–32)
CREAT SERPL-MCNC: 1.22 MG/DL (ref 0.67–1.17)
FASTING DURATION TIME PATIENT: ABNORMAL H
GFR SERPLBLD BASED ON 1.73 SQ M-ARVRAT: 60 ML/MIN
GLUCOSE SERPL-MCNC: 109 MG/DL (ref 70–99)
POTASSIUM SERPL-SCNC: 4.3 MMOL/L (ref 3.4–5.1)
SODIUM SERPL-SCNC: 141 MMOL/L (ref 135–145)

## 2023-07-21 DIAGNOSIS — I10 PRIMARY HYPERTENSION: ICD-10-CM

## 2023-07-21 RX ORDER — AMLODIPINE BESYLATE 5 MG/1
5 TABLET ORAL DAILY
Qty: 90 TABLET | Refills: 0 | OUTPATIENT
Start: 2023-07-21

## 2023-08-04 ENCOUNTER — LAB SERVICES (OUTPATIENT)
Dept: LAB | Age: 78
End: 2023-08-04

## 2023-08-04 ENCOUNTER — OFFICE VISIT (OUTPATIENT)
Dept: FAMILY MEDICINE | Age: 78
End: 2023-08-04

## 2023-08-04 VITALS
WEIGHT: 212 LBS | HEART RATE: 66 BPM | SYSTOLIC BLOOD PRESSURE: 134 MMHG | OXYGEN SATURATION: 99 % | DIASTOLIC BLOOD PRESSURE: 76 MMHG | TEMPERATURE: 97.8 F | BODY MASS INDEX: 26.5 KG/M2

## 2023-08-04 DIAGNOSIS — D30.01 RENAL ONCOCYTOMA OF RIGHT KIDNEY: ICD-10-CM

## 2023-08-04 DIAGNOSIS — I10 PRIMARY HYPERTENSION: ICD-10-CM

## 2023-08-04 DIAGNOSIS — I10 PRIMARY HYPERTENSION: Primary | ICD-10-CM

## 2023-08-04 LAB
CREAT UR-MCNC: 154 MG/DL
MICROALBUMIN UR-MCNC: 0.53 MG/DL
MICROALBUMIN/CREAT UR: 3.4 MG/G

## 2023-08-04 PROCEDURE — 3078F DIAST BP <80 MM HG: CPT | Performed by: FAMILY MEDICINE

## 2023-08-04 PROCEDURE — 3075F SYST BP GE 130 - 139MM HG: CPT | Performed by: FAMILY MEDICINE

## 2023-08-04 PROCEDURE — 82043 UR ALBUMIN QUANTITATIVE: CPT | Performed by: INTERNAL MEDICINE

## 2023-08-04 PROCEDURE — 82570 ASSAY OF URINE CREATININE: CPT | Performed by: INTERNAL MEDICINE

## 2023-08-04 PROCEDURE — 99214 OFFICE O/P EST MOD 30 MIN: CPT | Performed by: FAMILY MEDICINE

## 2023-08-04 RX ORDER — FEXOFENADINE HCL 180 MG/1
TABLET ORAL EVERY 24 HOURS
COMMUNITY

## 2023-08-04 ASSESSMENT — ENCOUNTER SYMPTOMS
NEUROLOGICAL NEGATIVE: 1
PSYCHIATRIC NEGATIVE: 1
ENDOCRINE NEGATIVE: 1
RESPIRATORY NEGATIVE: 1
GASTROINTESTINAL NEGATIVE: 1
HEMATOLOGIC/LYMPHATIC NEGATIVE: 1
CONSTITUTIONAL NEGATIVE: 1

## 2023-08-04 ASSESSMENT — PAIN SCALES - GENERAL: PAINLEVEL: 0

## 2023-08-05 ENCOUNTER — APPOINTMENT (OUTPATIENT)
Dept: FAMILY MEDICINE | Age: 78
End: 2023-08-05

## 2023-09-11 DIAGNOSIS — N52.9 ERECTILE DYSFUNCTION, UNSPECIFIED ERECTILE DYSFUNCTION TYPE: ICD-10-CM

## 2023-09-11 RX ORDER — TADALAFIL 5 MG/1
5 TABLET ORAL DAILY
Qty: 30 TABLET | Refills: 3 | Status: SHIPPED | OUTPATIENT
Start: 2023-09-11

## 2023-10-27 DIAGNOSIS — I10 PRIMARY HYPERTENSION: ICD-10-CM

## 2023-10-27 RX ORDER — AMLODIPINE BESYLATE 5 MG/1
5 TABLET ORAL DAILY
Qty: 90 TABLET | Refills: 0 | Status: SHIPPED | OUTPATIENT
Start: 2023-10-27 | End: 2023-11-10 | Stop reason: SDUPTHER

## 2023-11-08 ENCOUNTER — HOSPITAL ENCOUNTER (OUTPATIENT)
Dept: ULTRASOUND IMAGING | Age: 78
Discharge: HOME | End: 2023-11-08
Attending: SURGERY

## 2023-11-08 DIAGNOSIS — D41.02 NEOPLASM OF UNCERTAIN BEHAVIOR OF LEFT KIDNEY: ICD-10-CM

## 2023-11-08 DIAGNOSIS — D41.01 NEOPLASM OF UNCERTAIN BEHAVIOR OF RIGHT KIDNEY: ICD-10-CM

## 2023-11-08 PROCEDURE — 76775 US EXAM ABDO BACK WALL LIM: CPT

## 2023-11-10 ENCOUNTER — OFFICE VISIT (OUTPATIENT)
Dept: FAMILY MEDICINE | Age: 78
End: 2023-11-10

## 2023-11-10 VITALS
HEIGHT: 75 IN | DIASTOLIC BLOOD PRESSURE: 60 MMHG | OXYGEN SATURATION: 100 % | TEMPERATURE: 97.5 F | WEIGHT: 219 LBS | BODY MASS INDEX: 27.23 KG/M2 | HEART RATE: 60 BPM | RESPIRATION RATE: 18 BRPM | SYSTOLIC BLOOD PRESSURE: 138 MMHG

## 2023-11-10 DIAGNOSIS — D30.01 RENAL ONCOCYTOMA OF RIGHT KIDNEY: Primary | ICD-10-CM

## 2023-11-10 DIAGNOSIS — N18.31 STAGE 3A CHRONIC KIDNEY DISEASE (CMD): ICD-10-CM

## 2023-11-10 DIAGNOSIS — I10 PRIMARY HYPERTENSION: ICD-10-CM

## 2023-11-10 DIAGNOSIS — E27.8 OTHER SPECIFIED DISORDERS OF ADRENAL GLAND (CMD): ICD-10-CM

## 2023-11-10 PROCEDURE — 3075F SYST BP GE 130 - 139MM HG: CPT | Performed by: FAMILY MEDICINE

## 2023-11-10 PROCEDURE — 99214 OFFICE O/P EST MOD 30 MIN: CPT | Performed by: FAMILY MEDICINE

## 2023-11-10 PROCEDURE — 3078F DIAST BP <80 MM HG: CPT | Performed by: FAMILY MEDICINE

## 2023-11-10 RX ORDER — TRIAMTERENE AND HYDROCHLOROTHIAZIDE 37.5; 25 MG/1; MG/1
1 TABLET ORAL DAILY
Qty: 90 TABLET | Refills: 0 | Status: SHIPPED | OUTPATIENT
Start: 2023-11-10

## 2023-11-10 RX ORDER — AMLODIPINE BESYLATE 5 MG/1
5 TABLET ORAL DAILY
Qty: 90 TABLET | Refills: 0 | Status: SHIPPED | OUTPATIENT
Start: 2023-11-10

## 2023-11-10 ASSESSMENT — ENCOUNTER SYMPTOMS
ENDOCRINE NEGATIVE: 1
GASTROINTESTINAL NEGATIVE: 1
HEMATOLOGIC/LYMPHATIC NEGATIVE: 1
RESPIRATORY NEGATIVE: 1
NEUROLOGICAL NEGATIVE: 1
CONSTITUTIONAL NEGATIVE: 1
PSYCHIATRIC NEGATIVE: 1

## 2023-11-10 ASSESSMENT — PATIENT HEALTH QUESTIONNAIRE - PHQ9
SUM OF ALL RESPONSES TO PHQ9 QUESTIONS 1 AND 2: 0
2. FEELING DOWN, DEPRESSED OR HOPELESS: NOT AT ALL
SUM OF ALL RESPONSES TO PHQ9 QUESTIONS 1 AND 2: 0
1. LITTLE INTEREST OR PLEASURE IN DOING THINGS: NOT AT ALL
CLINICAL INTERPRETATION OF PHQ2 SCORE: NO FURTHER SCREENING NEEDED

## 2023-11-10 ASSESSMENT — PAIN SCALES - GENERAL: PAINLEVEL: 0

## 2023-12-06 DIAGNOSIS — N52.1 ERECTILE DYSFUNCTION DUE TO DISEASES CLASSIFIED ELSEWHERE: ICD-10-CM

## 2023-12-06 DIAGNOSIS — D41.01 NEOPLASM OF UNCERTAIN BEHAVIOR OF RIGHT KIDNEY: ICD-10-CM

## 2023-12-06 DIAGNOSIS — D41.02 NEOPLASM OF UNCERTAIN BEHAVIOR OF LEFT KIDNEY: Primary | ICD-10-CM

## 2023-12-08 DIAGNOSIS — J30.9 ALLERGIC RHINITIS, UNSPECIFIED SEASONALITY, UNSPECIFIED TRIGGER: ICD-10-CM

## 2023-12-09 RX ORDER — FLUTICASONE PROPIONATE 50 MCG
SPRAY, SUSPENSION (ML) NASAL
Qty: 16 G | Refills: 3 | Status: SHIPPED | OUTPATIENT
Start: 2023-12-09

## 2024-01-19 DIAGNOSIS — N52.9 ERECTILE DYSFUNCTION, UNSPECIFIED ERECTILE DYSFUNCTION TYPE: ICD-10-CM

## 2024-01-19 RX ORDER — TADALAFIL 5 MG/1
5 TABLET ORAL DAILY
Qty: 30 TABLET | Refills: 0 | Status: SHIPPED | OUTPATIENT
Start: 2024-01-19

## 2024-01-22 DIAGNOSIS — I10 PRIMARY HYPERTENSION: ICD-10-CM

## 2024-01-23 RX ORDER — AMLODIPINE BESYLATE 5 MG/1
5 TABLET ORAL DAILY
Qty: 90 TABLET | Refills: 0 | Status: SHIPPED | OUTPATIENT
Start: 2024-01-23

## 2024-02-09 ASSESSMENT — PATIENT HEALTH QUESTIONNAIRE - PHQ9
SUM OF ALL RESPONSES TO PHQ9 QUESTIONS 1 AND 2: 0
CLINICAL INTERPRETATION OF PHQ2 SCORE: NO FURTHER SCREENING NEEDED
SUM OF ALL RESPONSES TO PHQ9 QUESTIONS 1 AND 2: 0
2. FEELING DOWN, DEPRESSED OR HOPELESS: NOT AT ALL
CLINICAL INTERPRETATION OF PHQ2 SCORE: NO FURTHER SCREENING NEEDED
CLINICAL INTERPRETATION OF PHQ2 SCORE: NO FURTHER SCREENING NEEDED
CLINICAL INTERPRETATION OF PHQ2 SCORE: 0
1. LITTLE INTEREST OR PLEASURE IN DOING THINGS: NOT AT ALL
SUM OF ALL RESPONSES TO PHQ9 QUESTIONS 1 AND 2: 0

## 2024-02-13 ASSESSMENT — PATIENT HEALTH QUESTIONNAIRE - PHQ9
1. LITTLE INTEREST OR PLEASURE IN DOING THINGS: NOT AT ALL
2. FEELING DOWN, DEPRESSED OR HOPELESS: NOT AT ALL
CLINICAL INTERPRETATION OF PHQ2 SCORE: 0

## 2024-02-16 ENCOUNTER — CLINICAL ABSTRACT (OUTPATIENT)
Dept: HEALTH INFORMATION MANAGEMENT | Facility: OTHER | Age: 79
End: 2024-02-16

## 2024-02-16 ENCOUNTER — APPOINTMENT (OUTPATIENT)
Dept: FAMILY MEDICINE | Age: 79
End: 2024-02-16

## 2024-02-19 DIAGNOSIS — N52.9 ERECTILE DYSFUNCTION, UNSPECIFIED ERECTILE DYSFUNCTION TYPE: ICD-10-CM

## 2024-02-19 RX ORDER — TADALAFIL 5 MG/1
5 TABLET ORAL DAILY
Qty: 30 TABLET | Refills: 0 | Status: SHIPPED | OUTPATIENT
Start: 2024-02-19

## 2024-02-25 ASSESSMENT — PATIENT HEALTH QUESTIONNAIRE - PHQ9
CLINICAL INTERPRETATION OF PHQ2 SCORE: NO FURTHER SCREENING NEEDED
CLINICAL INTERPRETATION OF PHQ2 SCORE: 0
2. FEELING DOWN, DEPRESSED OR HOPELESS: NOT AT ALL
1. LITTLE INTEREST OR PLEASURE IN DOING THINGS: NOT AT ALL
SUM OF ALL RESPONSES TO PHQ9 QUESTIONS 1 AND 2: 0

## 2024-02-26 ENCOUNTER — LAB SERVICES (OUTPATIENT)
Dept: LAB | Age: 79
End: 2024-02-26

## 2024-02-26 ENCOUNTER — APPOINTMENT (OUTPATIENT)
Dept: FAMILY MEDICINE | Age: 79
End: 2024-02-26

## 2024-02-26 VITALS
OXYGEN SATURATION: 100 % | TEMPERATURE: 97.4 F | DIASTOLIC BLOOD PRESSURE: 60 MMHG | BODY MASS INDEX: 26.86 KG/M2 | HEIGHT: 75 IN | HEART RATE: 59 BPM | RESPIRATION RATE: 16 BRPM | WEIGHT: 216 LBS | SYSTOLIC BLOOD PRESSURE: 130 MMHG

## 2024-02-26 DIAGNOSIS — D30.01 RENAL ONCOCYTOMA OF RIGHT KIDNEY: ICD-10-CM

## 2024-02-26 DIAGNOSIS — I10 PRIMARY HYPERTENSION: ICD-10-CM

## 2024-02-26 DIAGNOSIS — Z00.00 ENCOUNTER FOR MEDICARE ANNUAL WELLNESS EXAM: ICD-10-CM

## 2024-02-26 DIAGNOSIS — R09.81 SINUS CONGESTION: ICD-10-CM

## 2024-02-26 DIAGNOSIS — J30.9 ALLERGIC RHINITIS, UNSPECIFIED SEASONALITY, UNSPECIFIED TRIGGER: ICD-10-CM

## 2024-02-26 DIAGNOSIS — Z12.11 COLON CANCER SCREENING: ICD-10-CM

## 2024-02-26 DIAGNOSIS — R73.9 HYPERGLYCEMIA: ICD-10-CM

## 2024-02-26 DIAGNOSIS — N18.31 STAGE 3A CHRONIC KIDNEY DISEASE (CMD): ICD-10-CM

## 2024-02-26 DIAGNOSIS — J01.10 ACUTE NON-RECURRENT FRONTAL SINUSITIS: Primary | ICD-10-CM

## 2024-02-26 DIAGNOSIS — E27.8 OTHER SPECIFIED DISORDERS OF ADRENAL GLAND (CMD): ICD-10-CM

## 2024-02-26 DIAGNOSIS — N52.9 ERECTILE DYSFUNCTION, UNSPECIFIED ERECTILE DYSFUNCTION TYPE: ICD-10-CM

## 2024-02-26 DIAGNOSIS — Z12.5 PROSTATE CANCER SCREENING: ICD-10-CM

## 2024-02-26 PROCEDURE — G0103 PSA SCREENING: HCPCS | Performed by: CLINICAL MEDICAL LABORATORY

## 2024-02-26 PROCEDURE — 80053 COMPREHEN METABOLIC PANEL: CPT | Performed by: CLINICAL MEDICAL LABORATORY

## 2024-02-26 PROCEDURE — 84443 ASSAY THYROID STIM HORMONE: CPT | Performed by: CLINICAL MEDICAL LABORATORY

## 2024-02-26 PROCEDURE — 85027 COMPLETE CBC AUTOMATED: CPT | Performed by: CLINICAL MEDICAL LABORATORY

## 2024-02-26 PROCEDURE — 81003 URINALYSIS AUTO W/O SCOPE: CPT | Performed by: CLINICAL MEDICAL LABORATORY

## 2024-02-26 PROCEDURE — 36415 COLL VENOUS BLD VENIPUNCTURE: CPT | Performed by: FAMILY MEDICINE

## 2024-02-26 PROCEDURE — 83036 HEMOGLOBIN GLYCOSYLATED A1C: CPT | Performed by: CLINICAL MEDICAL LABORATORY

## 2024-02-26 PROCEDURE — 82043 UR ALBUMIN QUANTITATIVE: CPT | Performed by: CLINICAL MEDICAL LABORATORY

## 2024-02-26 PROCEDURE — 82570 ASSAY OF URINE CREATININE: CPT | Performed by: CLINICAL MEDICAL LABORATORY

## 2024-02-26 PROCEDURE — 80061 LIPID PANEL: CPT | Performed by: CLINICAL MEDICAL LABORATORY

## 2024-02-26 RX ORDER — AMOXICILLIN 500 MG/1
500 CAPSULE ORAL 3 TIMES DAILY
Qty: 30 CAPSULE | Refills: 0 | Status: SHIPPED | OUTPATIENT
Start: 2024-02-26 | End: 2024-03-07

## 2024-02-26 RX ORDER — AMLODIPINE BESYLATE 5 MG/1
5 TABLET ORAL DAILY
Qty: 90 TABLET | Refills: 0 | Status: SHIPPED | OUTPATIENT
Start: 2024-02-26

## 2024-02-26 RX ORDER — TADALAFIL 5 MG/1
5 TABLET ORAL DAILY
Qty: 30 TABLET | Refills: 1 | Status: SHIPPED | OUTPATIENT
Start: 2024-02-26

## 2024-02-26 RX ORDER — TRIAMTERENE AND HYDROCHLOROTHIAZIDE 37.5; 25 MG/1; MG/1
1 TABLET ORAL DAILY
Qty: 90 TABLET | Refills: 0 | Status: SHIPPED | OUTPATIENT
Start: 2024-02-26

## 2024-02-26 RX ORDER — FLUTICASONE PROPIONATE 50 MCG
SPRAY, SUSPENSION (ML) NASAL
Qty: 16 G | Refills: 3 | Status: SHIPPED | OUTPATIENT
Start: 2024-02-26

## 2024-02-26 RX ORDER — MONTELUKAST SODIUM 10 MG/1
10 TABLET ORAL DAILY
Qty: 90 TABLET | Refills: 3 | Status: SHIPPED | OUTPATIENT
Start: 2024-02-26 | End: 2026-08-23

## 2024-02-26 ASSESSMENT — PAIN SCALES - GENERAL: PAINLEVEL: 0

## 2024-02-26 ASSESSMENT — COGNITIVE AND FUNCTIONAL STATUS - GENERAL
DO YOU HAVE SERIOUS DIFFICULTY WALKING OR CLIMBING STAIRS: NO
ARE YOU DEAF OR DO YOU HAVE SERIOUS DIFFICULTY  HEARING: NO
BECAUSE OF A PHYSICAL, MENTAL, OR EMOTIONAL CONDITION, DO YOU HAVE DIFFICULTY DOING ERRANDS ALONE: NO
BECAUSE OF A PHYSICAL, MENTAL, OR EMOTIONAL CONDITION, DO YOU HAVE SERIOUS DIFFICULTY CONCENTRATING, REMEMBERING OR MAKING DECISIONS: NO
ARE YOU BLIND OR DO YOU HAVE SERIOUS DIFFICULTY SEEING, EVEN WHEN WEARING GLASSES: NO
DO YOU HAVE DIFFICULTY DRESSING OR BATHING: NO

## 2024-02-26 ASSESSMENT — ACTIVITIES OF DAILY LIVING (ADL)
NEEDS_ASSIST: NO
ADL_SCORE: 12
ADL_BEFORE_ADMISSION: INDEPENDENT
RECENT_DECLINE_ADL: NO
ADL_SHORT_OF_BREATH: NO

## 2024-02-26 ASSESSMENT — MINI COG
PATIENT ABLE TO FILL IN THE CLOCK FACE WITH 10 MINUTES PAST 11 O'CLOCK?: YES, CLOCK IS CORRECT
TOTAL SCORE: 5
PATIENT ABLE TO REPEAT THE 3 WORDS GIVEN PREVIOUSLY?: WAS ABLE TO REPEAT BACK 3 WORDS CORRECTLY
PATIENT WAS GIVEN REPEAT BACK WORDS FROM VERSION: 3 - VILLAGE KITCHEN BABY

## 2024-02-27 ENCOUNTER — TELEPHONE (OUTPATIENT)
Dept: FAMILY MEDICINE | Age: 79
End: 2024-02-27

## 2024-02-27 LAB
ALBUMIN SERPL-MCNC: 4 G/DL (ref 3.6–5.1)
ALBUMIN/GLOB SERPL: 1.2 {RATIO} (ref 1–2.4)
ALP SERPL-CCNC: 63 UNITS/L (ref 45–117)
ALT SERPL-CCNC: 22 UNITS/L
ANION GAP SERPL CALC-SCNC: 14 MMOL/L (ref 7–19)
APPEARANCE UR: CLEAR
AST SERPL-CCNC: 13 UNITS/L
BILIRUB SERPL-MCNC: 0.5 MG/DL (ref 0.2–1)
BILIRUB UR QL STRIP: NEGATIVE
BUN SERPL-MCNC: 12 MG/DL (ref 6–20)
BUN/CREAT SERPL: 10 (ref 7–25)
CALCIUM SERPL-MCNC: 10.1 MG/DL (ref 8.4–10.2)
CHLORIDE SERPL-SCNC: 104 MMOL/L (ref 97–110)
CHOLEST SERPL-MCNC: 155 MG/DL
CHOLEST/HDLC SERPL: 3.2 {RATIO}
CO2 SERPL-SCNC: 24 MMOL/L (ref 21–32)
COLOR UR: ABNORMAL
CREAT SERPL-MCNC: 1.21 MG/DL (ref 0.67–1.17)
DEPRECATED RDW RBC: 44.2 FL (ref 39–50)
EGFRCR SERPLBLD CKD-EPI 2021: 61 ML/MIN/{1.73_M2}
ERYTHROCYTE [DISTWIDTH] IN BLOOD: 13.6 % (ref 11–15)
FASTING DURATION TIME PATIENT: 12 HOURS (ref 0–999)
GLOBULIN SER-MCNC: 3.3 G/DL (ref 2–4)
GLUCOSE SERPL-MCNC: 94 MG/DL (ref 70–99)
GLUCOSE UR STRIP-MCNC: NEGATIVE MG/DL
HBA1C MFR BLD: 5.7 % (ref 4.5–5.6)
HCT VFR BLD CALC: 44.1 % (ref 39–51)
HDLC SERPL-MCNC: 49 MG/DL
HGB BLD-MCNC: 14.6 G/DL (ref 13–17)
HGB UR QL STRIP: NEGATIVE
KETONES UR STRIP-MCNC: NEGATIVE MG/DL
LDLC SERPL CALC-MCNC: 73 MG/DL
LEUKOCYTE ESTERASE UR QL STRIP: NEGATIVE
MCH RBC QN AUTO: 29.6 PG (ref 26–34)
MCHC RBC AUTO-ENTMCNC: 33.1 G/DL (ref 32–36.5)
MCV RBC AUTO: 89.3 FL (ref 78–100)
NITRITE UR QL STRIP: NEGATIVE
NONHDLC SERPL-MCNC: 106 MG/DL
NRBC BLD MANUAL-RTO: 0 /100 WBC
PH UR STRIP: 7.5 [PH] (ref 5–7)
PLATELET # BLD AUTO: 245 K/MCL (ref 140–450)
POTASSIUM SERPL-SCNC: 4.2 MMOL/L (ref 3.4–5.1)
PROT SERPL-MCNC: 7.3 G/DL (ref 6.4–8.2)
PROT UR STRIP-MCNC: NEGATIVE MG/DL
PSA SERPL-MCNC: 1.23 NG/ML
RBC # BLD: 4.94 MIL/MCL (ref 4.5–5.9)
SODIUM SERPL-SCNC: 138 MMOL/L (ref 135–145)
SP GR UR STRIP: 1.02 (ref 1–1.03)
TRIGL SERPL-MCNC: 167 MG/DL
TSH SERPL-ACNC: 0.52 MCUNITS/ML (ref 0.35–5)
UROBILINOGEN UR STRIP-MCNC: 0.2 MG/DL
WBC # BLD: 5.1 K/MCL (ref 4.2–11)

## 2024-02-28 ENCOUNTER — APPOINTMENT (OUTPATIENT)
Dept: FAMILY MEDICINE | Age: 79
End: 2024-02-28

## 2024-02-28 ENCOUNTER — LAB SERVICES (OUTPATIENT)
Dept: LAB | Age: 79
End: 2024-02-28

## 2024-02-28 DIAGNOSIS — Z12.11 COLON CANCER SCREENING: ICD-10-CM

## 2024-02-28 LAB
CREAT UR-MCNC: 94.9 MG/DL
MICROALBUMIN UR-MCNC: <0.5 MG/DL
MICROALBUMIN/CREAT UR: NORMAL MG/G{CREAT}

## 2024-02-28 PROCEDURE — 82274 ASSAY TEST FOR BLOOD FECAL: CPT | Performed by: CLINICAL MEDICAL LABORATORY

## 2024-02-29 LAB — HEMOCCULT STL QL: NEGATIVE

## 2024-03-15 ENCOUNTER — TELEPHONE (OUTPATIENT)
Dept: FAMILY MEDICINE | Age: 79
End: 2024-03-15

## 2024-03-18 ENCOUNTER — APPOINTMENT (OUTPATIENT)
Dept: LAB | Age: 79
End: 2024-03-18

## 2024-03-18 DIAGNOSIS — N18.31 STAGE 3A CHRONIC KIDNEY DISEASE (CMD): ICD-10-CM

## 2024-03-18 LAB
CREAT UR-MCNC: 229 MG/DL
MICROALBUMIN UR-MCNC: 0.84 MG/DL
MICROALBUMIN/CREAT UR: 3.7 MG/G

## 2024-03-18 PROCEDURE — 82043 UR ALBUMIN QUANTITATIVE: CPT | Performed by: CLINICAL MEDICAL LABORATORY

## 2024-03-18 PROCEDURE — 82570 ASSAY OF URINE CREATININE: CPT | Performed by: CLINICAL MEDICAL LABORATORY

## 2024-04-22 ENCOUNTER — EXTERNAL RECORD (OUTPATIENT)
Dept: HEALTH INFORMATION MANAGEMENT | Facility: OTHER | Age: 79
End: 2024-04-22

## 2024-05-23 DIAGNOSIS — N52.9 ERECTILE DYSFUNCTION, UNSPECIFIED ERECTILE DYSFUNCTION TYPE: ICD-10-CM

## 2024-05-24 RX ORDER — TADALAFIL 5 MG/1
5 TABLET ORAL DAILY
Qty: 30 TABLET | Refills: 0 | Status: SHIPPED | OUTPATIENT
Start: 2024-05-24 | End: 2024-05-29 | Stop reason: SDUPTHER

## 2024-05-29 ENCOUNTER — APPOINTMENT (OUTPATIENT)
Dept: FAMILY MEDICINE | Age: 79
End: 2024-05-29

## 2024-05-29 ENCOUNTER — TELEPHONE (OUTPATIENT)
Dept: FAMILY MEDICINE | Age: 79
End: 2024-05-29

## 2024-05-29 VITALS
DIASTOLIC BLOOD PRESSURE: 72 MMHG | TEMPERATURE: 98 F | SYSTOLIC BLOOD PRESSURE: 135 MMHG | HEART RATE: 61 BPM | HEIGHT: 75 IN | WEIGHT: 215.9 LBS | OXYGEN SATURATION: 97 % | BODY MASS INDEX: 26.84 KG/M2 | RESPIRATION RATE: 16 BRPM

## 2024-05-29 DIAGNOSIS — D30.01 RENAL ONCOCYTOMA OF RIGHT KIDNEY: Primary | ICD-10-CM

## 2024-05-29 DIAGNOSIS — N52.9 ERECTILE DYSFUNCTION, UNSPECIFIED ERECTILE DYSFUNCTION TYPE: ICD-10-CM

## 2024-05-29 DIAGNOSIS — I10 PRIMARY HYPERTENSION: ICD-10-CM

## 2024-05-29 RX ORDER — TADALAFIL 5 MG/1
5 TABLET ORAL DAILY
Qty: 30 TABLET | Refills: 0 | Status: SHIPPED | OUTPATIENT
Start: 2024-05-29

## 2024-05-29 RX ORDER — AMLODIPINE BESYLATE 5 MG/1
5 TABLET ORAL DAILY
Qty: 90 TABLET | Refills: 0 | Status: SHIPPED | OUTPATIENT
Start: 2024-05-29

## 2024-05-29 RX ORDER — TRIAMTERENE AND HYDROCHLOROTHIAZIDE 37.5; 25 MG/1; MG/1
1 TABLET ORAL DAILY
Qty: 90 TABLET | Refills: 0 | Status: SHIPPED | OUTPATIENT
Start: 2024-05-29

## 2024-05-29 ASSESSMENT — ENCOUNTER SYMPTOMS
PSYCHIATRIC NEGATIVE: 1
RESPIRATORY NEGATIVE: 1
NEUROLOGICAL NEGATIVE: 1
GASTROINTESTINAL NEGATIVE: 1
HEMATOLOGIC/LYMPHATIC NEGATIVE: 1
ENDOCRINE NEGATIVE: 1
CONSTITUTIONAL NEGATIVE: 1

## 2024-05-29 ASSESSMENT — PATIENT HEALTH QUESTIONNAIRE - PHQ9
SUM OF ALL RESPONSES TO PHQ9 QUESTIONS 1 AND 2: 0
SUM OF ALL RESPONSES TO PHQ9 QUESTIONS 1 AND 2: 0
1. LITTLE INTEREST OR PLEASURE IN DOING THINGS: NOT AT ALL
CLINICAL INTERPRETATION OF PHQ2 SCORE: NO FURTHER SCREENING NEEDED
2. FEELING DOWN, DEPRESSED OR HOPELESS: NOT AT ALL

## 2024-05-29 ASSESSMENT — PAIN SCALES - GENERAL: PAINLEVEL: 0

## 2024-06-21 DIAGNOSIS — J30.9 ALLERGIC RHINITIS, UNSPECIFIED SEASONALITY, UNSPECIFIED TRIGGER: ICD-10-CM

## 2024-06-21 RX ORDER — FLUTICASONE PROPIONATE 50 MCG
SPRAY, SUSPENSION (ML) NASAL
Qty: 16 G | Refills: 3 | Status: SHIPPED | OUTPATIENT
Start: 2024-06-21

## 2024-07-25 DIAGNOSIS — N52.9 ERECTILE DYSFUNCTION, UNSPECIFIED ERECTILE DYSFUNCTION TYPE: ICD-10-CM

## 2024-07-25 RX ORDER — TADALAFIL 5 MG/1
5 TABLET ORAL DAILY
Qty: 30 TABLET | Refills: 1 | Status: SHIPPED | OUTPATIENT
Start: 2024-07-25

## 2024-07-25 RX ORDER — TADALAFIL 5 MG/1
5 TABLET ORAL DAILY
Qty: 30 TABLET | Refills: 1 | Status: SHIPPED | OUTPATIENT
Start: 2024-07-25 | End: 2024-07-25 | Stop reason: SDUPTHER

## 2024-08-29 SDOH — HEALTH STABILITY: PHYSICAL HEALTH: ON AVERAGE, HOW MANY DAYS PER WEEK DO YOU ENGAGE IN MODERATE TO STRENUOUS EXERCISE (LIKE A BRISK WALK)?: 5 DAYS

## 2024-08-29 SDOH — ECONOMIC STABILITY: FOOD INSECURITY: WITHIN THE PAST 12 MONTHS, THE FOOD YOU BOUGHT JUST DIDN'T LAST AND YOU DIDN'T HAVE MONEY TO GET MORE.: NEVER TRUE

## 2024-08-29 SDOH — ECONOMIC STABILITY: GENERAL

## 2024-08-29 SDOH — ECONOMIC STABILITY: TRANSPORTATION INSECURITY
IN THE PAST 12 MONTHS, HAS LACK OF RELIABLE TRANSPORTATION KEPT YOU FROM MEDICAL APPOINTMENTS, MEETINGS, WORK OR FROM GETTING THINGS NEEDED FOR DAILY LIVING?: NO

## 2024-08-29 SDOH — HEALTH STABILITY: PHYSICAL HEALTH: ON AVERAGE, HOW MANY MINUTES DO YOU ENGAGE IN EXERCISE AT THIS LEVEL?: 120 MIN

## 2024-08-29 SDOH — ECONOMIC STABILITY: HOUSING INSECURITY: WHAT IS YOUR LIVING SITUATION TODAY?: I HAVE A STEADY PLACE TO LIVE

## 2024-08-29 SDOH — SOCIAL STABILITY: SOCIAL NETWORK
HOW OFTEN DO YOU SEE OR TALK TO PEOPLE THAT YOU CARE ABOUT AND FEEL CLOSE TO? (FOR EXAMPLE: TALKING TO FRIENDS ON THE PHONE, VISITING FRIENDS OR FAMILY, GOING TO CHURCH OR CLUB MEETINGS): 5 OR MORE TIMES A WEEK

## 2024-08-29 SDOH — ECONOMIC STABILITY: HOUSING INSECURITY: DO YOU HAVE PROBLEMS WITH ANY OF THE FOLLOWING?: NONE OF THE ABOVE

## 2024-08-29 ASSESSMENT — LIFESTYLE VARIABLES
HOW OFTEN DO YOU HAVE A DRINK CONTAINING ALCOHOL: NEVER
HOW MANY STANDARD DRINKS CONTAINING ALCOHOL DO YOU HAVE ON A TYPICAL DAY: 0,1 OR 2
AUDIT-C TOTAL SCORE: 0

## 2024-08-29 ASSESSMENT — SOCIAL DETERMINANTS OF HEALTH (SDOH): IN THE PAST 12 MONTHS, HAS THE ELECTRIC, GAS, OIL, OR WATER COMPANY THREATENED TO SHUT OFF SERVICE IN YOUR HOME?: NO

## 2024-08-30 ENCOUNTER — APPOINTMENT (OUTPATIENT)
Dept: FAMILY MEDICINE | Age: 79
End: 2024-08-30

## 2024-09-04 ENCOUNTER — APPOINTMENT (OUTPATIENT)
Dept: FAMILY MEDICINE | Age: 79
End: 2024-09-04

## 2024-09-04 VITALS
HEIGHT: 75 IN | RESPIRATION RATE: 16 BRPM | WEIGHT: 211.44 LBS | HEART RATE: 57 BPM | SYSTOLIC BLOOD PRESSURE: 118 MMHG | TEMPERATURE: 97.7 F | DIASTOLIC BLOOD PRESSURE: 58 MMHG | OXYGEN SATURATION: 99 % | BODY MASS INDEX: 26.29 KG/M2

## 2024-09-04 DIAGNOSIS — I10 PRIMARY HYPERTENSION: Primary | ICD-10-CM

## 2024-09-04 DIAGNOSIS — N52.9 ERECTILE DYSFUNCTION, UNSPECIFIED ERECTILE DYSFUNCTION TYPE: ICD-10-CM

## 2024-09-04 DIAGNOSIS — D30.02 RENAL ONCOCYTOMA, LEFT: ICD-10-CM

## 2024-09-04 RX ORDER — TADALAFIL 5 MG/1
5 TABLET ORAL DAILY
Qty: 90 TABLET | Refills: 1 | Status: SHIPPED | OUTPATIENT
Start: 2024-09-04

## 2024-09-04 ASSESSMENT — ENCOUNTER SYMPTOMS
HEMATOLOGIC/LYMPHATIC NEGATIVE: 1
GASTROINTESTINAL NEGATIVE: 1
PSYCHIATRIC NEGATIVE: 1
ENDOCRINE NEGATIVE: 1
RESPIRATORY NEGATIVE: 1
NEUROLOGICAL NEGATIVE: 1
CONSTITUTIONAL NEGATIVE: 1

## 2024-09-04 ASSESSMENT — PATIENT HEALTH QUESTIONNAIRE - PHQ9
2. FEELING DOWN, DEPRESSED OR HOPELESS: NOT AT ALL
SUM OF ALL RESPONSES TO PHQ9 QUESTIONS 1 AND 2: 0
1. LITTLE INTEREST OR PLEASURE IN DOING THINGS: NOT AT ALL
SUM OF ALL RESPONSES TO PHQ9 QUESTIONS 1 AND 2: 0
CLINICAL INTERPRETATION OF PHQ2 SCORE: NO FURTHER SCREENING NEEDED
IS THE PATIENT ABLE TO COGNITIVELY COMPLETE THE PHQ9: NO

## 2024-10-21 DIAGNOSIS — I10 PRIMARY HYPERTENSION: ICD-10-CM

## 2024-10-22 RX ORDER — AMLODIPINE BESYLATE 5 MG/1
5 TABLET ORAL DAILY
Qty: 90 TABLET | Refills: 0 | Status: SHIPPED | OUTPATIENT
Start: 2024-10-22

## 2024-11-13 ENCOUNTER — HOSPITAL ENCOUNTER (OUTPATIENT)
Dept: ULTRASOUND IMAGING | Age: 79
Discharge: HOME | End: 2024-11-13
Attending: SURGERY

## 2024-11-13 DIAGNOSIS — D41.01 NEOPLASM OF UNCERTAIN BEHAVIOR OF RIGHT KIDNEY: ICD-10-CM

## 2024-11-13 DIAGNOSIS — D41.02 NEOPLASM OF UNCERTAIN BEHAVIOR OF LEFT KIDNEY: ICD-10-CM

## 2024-11-13 DIAGNOSIS — N52.1 ERECTILE DYSFUNCTION DUE TO DISEASES CLASSIFIED ELSEWHERE: ICD-10-CM

## 2024-11-13 PROCEDURE — 76775 US EXAM ABDO BACK WALL LIM: CPT

## 2024-12-04 ENCOUNTER — LAB SERVICES (OUTPATIENT)
Dept: LAB | Age: 79
End: 2024-12-04

## 2024-12-04 ENCOUNTER — APPOINTMENT (OUTPATIENT)
Dept: FAMILY MEDICINE | Age: 79
End: 2024-12-04

## 2024-12-04 VITALS
DIASTOLIC BLOOD PRESSURE: 70 MMHG | BODY MASS INDEX: 26.86 KG/M2 | OXYGEN SATURATION: 100 % | TEMPERATURE: 97.1 F | WEIGHT: 216 LBS | RESPIRATION RATE: 18 BRPM | SYSTOLIC BLOOD PRESSURE: 138 MMHG | HEART RATE: 68 BPM | HEIGHT: 75 IN

## 2024-12-04 DIAGNOSIS — I10 PRIMARY HYPERTENSION: ICD-10-CM

## 2024-12-04 DIAGNOSIS — D30.01 RENAL ONCOCYTOMA OF RIGHT KIDNEY: ICD-10-CM

## 2024-12-04 DIAGNOSIS — N28.89 LEFT KIDNEY MASS: ICD-10-CM

## 2024-12-04 DIAGNOSIS — E78.1 HYPERTRIGLYCERIDEMIA: ICD-10-CM

## 2024-12-04 DIAGNOSIS — N28.89 LEFT KIDNEY MASS: Primary | ICD-10-CM

## 2024-12-04 LAB
ANION GAP SERPL CALC-SCNC: 10 MMOL/L (ref 7–19)
BUN SERPL-MCNC: 13 MG/DL (ref 6–20)
BUN/CREAT SERPL: 11 (ref 7–25)
CALCIUM SERPL-MCNC: 9.9 MG/DL (ref 8.4–10.2)
CHLORIDE SERPL-SCNC: 104 MMOL/L (ref 97–110)
CHOLEST SERPL-MCNC: 179 MG/DL
CHOLEST/HDLC SERPL: 3.4 {RATIO}
CO2 SERPL-SCNC: 29 MMOL/L (ref 21–32)
CREAT SERPL-MCNC: 1.17 MG/DL (ref 0.67–1.17)
EGFRCR SERPLBLD CKD-EPI 2021: 63 ML/MIN/{1.73_M2}
FASTING DURATION TIME PATIENT: NORMAL H
GLUCOSE SERPL-MCNC: 99 MG/DL (ref 70–99)
HDLC SERPL-MCNC: 52 MG/DL
LDLC SERPL CALC-MCNC: 97 MG/DL
NONHDLC SERPL-MCNC: 127 MG/DL
POTASSIUM SERPL-SCNC: 4.1 MMOL/L (ref 3.4–5.1)
SODIUM SERPL-SCNC: 139 MMOL/L (ref 135–145)
TRIGL SERPL-MCNC: 150 MG/DL

## 2024-12-04 PROCEDURE — 36415 COLL VENOUS BLD VENIPUNCTURE: CPT | Performed by: FAMILY MEDICINE

## 2024-12-04 PROCEDURE — 80061 LIPID PANEL: CPT | Performed by: CLINICAL MEDICAL LABORATORY

## 2024-12-04 PROCEDURE — 81003 URINALYSIS AUTO W/O SCOPE: CPT | Performed by: CLINICAL MEDICAL LABORATORY

## 2024-12-04 PROCEDURE — 80048 BASIC METABOLIC PNL TOTAL CA: CPT | Performed by: CLINICAL MEDICAL LABORATORY

## 2024-12-04 ASSESSMENT — ENCOUNTER SYMPTOMS
PSYCHIATRIC NEGATIVE: 1
RESPIRATORY NEGATIVE: 1
GASTROINTESTINAL NEGATIVE: 1
ENDOCRINE NEGATIVE: 1
NEUROLOGICAL NEGATIVE: 1
CONSTITUTIONAL NEGATIVE: 1
HEMATOLOGIC/LYMPHATIC NEGATIVE: 1

## 2024-12-04 ASSESSMENT — PAIN SCALES - GENERAL: PAINLEVEL: 0

## 2024-12-05 DIAGNOSIS — D41.02 NEOPLASM OF UNCERTAIN BEHAVIOR OF LEFT KIDNEY: Primary | ICD-10-CM

## 2024-12-05 DIAGNOSIS — D41.01 NEOPLASM OF UNCERTAIN BEHAVIOR OF RIGHT KIDNEY: ICD-10-CM

## 2024-12-05 LAB
APPEARANCE UR: CLEAR
BILIRUB UR QL STRIP: NEGATIVE
COLOR UR: ABNORMAL
GLUCOSE UR STRIP-MCNC: NEGATIVE MG/DL
HGB UR QL STRIP: NEGATIVE
KETONES UR STRIP-MCNC: NEGATIVE MG/DL
LEUKOCYTE ESTERASE UR QL STRIP: NEGATIVE
NITRITE UR QL STRIP: NEGATIVE
PH UR STRIP: 7.5 [PH] (ref 5–7)
PROT UR STRIP-MCNC: NEGATIVE MG/DL
SP GR UR STRIP: 1.02 (ref 1–1.03)
UROBILINOGEN UR STRIP-MCNC: 0.2 MG/DL

## 2024-12-11 DIAGNOSIS — I10 PRIMARY HYPERTENSION: ICD-10-CM

## 2024-12-11 RX ORDER — TRIAMTERENE AND HYDROCHLOROTHIAZIDE 37.5; 25 MG/1; MG/1
1 TABLET ORAL DAILY
Qty: 90 TABLET | Refills: 0 | Status: CANCELLED | OUTPATIENT
Start: 2024-12-11

## 2024-12-12 DIAGNOSIS — I10 PRIMARY HYPERTENSION: ICD-10-CM

## 2024-12-12 RX ORDER — TRIAMTERENE AND HYDROCHLOROTHIAZIDE 37.5; 25 MG/1; MG/1
1 TABLET ORAL DAILY
Qty: 90 TABLET | Refills: 0 | Status: SHIPPED | OUTPATIENT
Start: 2024-12-12

## 2025-01-18 DIAGNOSIS — I10 PRIMARY HYPERTENSION: ICD-10-CM

## 2025-01-20 RX ORDER — AMLODIPINE BESYLATE 5 MG/1
5 TABLET ORAL DAILY
Qty: 90 TABLET | Refills: 0 | Status: SHIPPED | OUTPATIENT
Start: 2025-01-20

## 2025-03-04 ENCOUNTER — APPOINTMENT (OUTPATIENT)
Dept: FAMILY MEDICINE | Age: 80
End: 2025-03-04

## 2025-03-04 VITALS
DIASTOLIC BLOOD PRESSURE: 64 MMHG | WEIGHT: 215 LBS | BODY MASS INDEX: 26.73 KG/M2 | RESPIRATION RATE: 18 BRPM | OXYGEN SATURATION: 99 % | TEMPERATURE: 98 F | SYSTOLIC BLOOD PRESSURE: 120 MMHG | HEART RATE: 66 BPM | HEIGHT: 75 IN

## 2025-03-04 DIAGNOSIS — D30.01 RENAL ONCOCYTOMA OF RIGHT KIDNEY: ICD-10-CM

## 2025-03-04 DIAGNOSIS — N18.31 STAGE 3A CHRONIC KIDNEY DISEASE  (CMD): ICD-10-CM

## 2025-03-04 DIAGNOSIS — E78.1 PURE HYPERTRIGLYCERIDEMIA: Primary | ICD-10-CM

## 2025-03-04 DIAGNOSIS — I10 PRIMARY HYPERTENSION: ICD-10-CM

## 2025-03-04 DIAGNOSIS — J30.9 ALLERGIC RHINITIS, UNSPECIFIED SEASONALITY, UNSPECIFIED TRIGGER: ICD-10-CM

## 2025-03-04 PROCEDURE — 3078F DIAST BP <80 MM HG: CPT | Performed by: FAMILY MEDICINE

## 2025-03-04 PROCEDURE — 99214 OFFICE O/P EST MOD 30 MIN: CPT | Performed by: FAMILY MEDICINE

## 2025-03-04 PROCEDURE — 3074F SYST BP LT 130 MM HG: CPT | Performed by: FAMILY MEDICINE

## 2025-03-04 RX ORDER — MONTELUKAST SODIUM 10 MG/1
10 TABLET ORAL DAILY
Qty: 90 TABLET | Refills: 0 | Status: SHIPPED | OUTPATIENT
Start: 2025-03-04 | End: 2027-08-30

## 2025-03-04 RX ORDER — AMLODIPINE BESYLATE 5 MG/1
5 TABLET ORAL DAILY
Qty: 90 TABLET | Refills: 0 | Status: SHIPPED | OUTPATIENT
Start: 2025-03-04

## 2025-03-04 RX ORDER — TRIAMTERENE AND HYDROCHLOROTHIAZIDE 37.5; 25 MG/1; MG/1
1 TABLET ORAL DAILY
Qty: 90 TABLET | Refills: 0 | Status: SHIPPED | OUTPATIENT
Start: 2025-03-04

## 2025-03-04 RX ORDER — FLUTICASONE PROPIONATE 50 MCG
SPRAY, SUSPENSION (ML) NASAL
Qty: 16 G | Refills: 3 | Status: SHIPPED | OUTPATIENT
Start: 2025-03-04

## 2025-03-04 ASSESSMENT — ENCOUNTER SYMPTOMS
NEUROLOGICAL NEGATIVE: 1
RESPIRATORY NEGATIVE: 1
PSYCHIATRIC NEGATIVE: 1
HEMATOLOGIC/LYMPHATIC NEGATIVE: 1
CONSTITUTIONAL NEGATIVE: 1
GASTROINTESTINAL NEGATIVE: 1
ENDOCRINE NEGATIVE: 1

## 2025-03-04 ASSESSMENT — PATIENT HEALTH QUESTIONNAIRE - PHQ9
SUM OF ALL RESPONSES TO PHQ9 QUESTIONS 1 AND 2: 0
SUM OF ALL RESPONSES TO PHQ9 QUESTIONS 1 AND 2: 0
1. LITTLE INTEREST OR PLEASURE IN DOING THINGS: NOT AT ALL
2. FEELING DOWN, DEPRESSED OR HOPELESS: NOT AT ALL
CLINICAL INTERPRETATION OF PHQ2 SCORE: NO FURTHER SCREENING NEEDED

## 2025-03-04 ASSESSMENT — COGNITIVE AND FUNCTIONAL STATUS - GENERAL
BECAUSE OF A PHYSICAL, MENTAL, OR EMOTIONAL CONDITION, DO YOU HAVE DIFFICULTY DOING ERRANDS ALONE: NO
BECAUSE OF A PHYSICAL, MENTAL, OR EMOTIONAL CONDITION, DO YOU HAVE SERIOUS DIFFICULTY CONCENTRATING, REMEMBERING OR MAKING DECISIONS: NO
DO YOU HAVE DIFFICULTY DRESSING OR BATHING: NO
DO YOU HAVE SERIOUS DIFFICULTY WALKING OR CLIMBING STAIRS: NO

## 2025-03-17 ENCOUNTER — HOSPITAL ENCOUNTER (OUTPATIENT)
Dept: CT IMAGING | Age: 80
Discharge: HOME | End: 2025-03-17
Attending: SURGERY

## 2025-03-17 DIAGNOSIS — D41.01 NEOPLASM OF UNCERTAIN BEHAVIOR OF RIGHT KIDNEY: ICD-10-CM

## 2025-03-17 DIAGNOSIS — D41.02 NEOPLASM OF UNCERTAIN BEHAVIOR OF LEFT KIDNEY: ICD-10-CM

## 2025-03-17 LAB
CREAT SERPL-MCNC: 1.5 MG/DL (ref 0.67–1.17)
EGFRCR SERPLBLD CKD-EPI 2021: 46 ML/MIN/{1.73_M2}

## 2025-03-17 PROCEDURE — 82565 ASSAY OF CREATININE: CPT

## 2025-03-17 PROCEDURE — 10002805 HB CONTRAST AGENT: Performed by: SURGERY

## 2025-03-17 PROCEDURE — 74177 CT ABD & PELVIS W/CONTRAST: CPT

## 2025-03-17 RX ADMIN — IOHEXOL 100 ML: 350 INJECTION, SOLUTION INTRAVENOUS at 06:46

## 2025-04-09 ENCOUNTER — TELEPHONE (OUTPATIENT)
Dept: GENERAL RADIOLOGY | Age: 80
End: 2025-04-09

## 2025-04-09 DIAGNOSIS — N28.89 LEFT RENAL MASS: Primary | ICD-10-CM

## 2025-04-11 ENCOUNTER — TELEPHONE (OUTPATIENT)
Dept: GENERAL RADIOLOGY | Age: 80
End: 2025-04-11

## 2025-04-11 RX ORDER — SODIUM CHLORIDE 9 MG/ML
INJECTION, SOLUTION INTRAVENOUS CONTINUOUS
Status: CANCELLED | OUTPATIENT
Start: 2025-04-18

## 2025-04-16 DIAGNOSIS — I10 PRIMARY HYPERTENSION: ICD-10-CM

## 2025-04-17 RX ORDER — AMLODIPINE BESYLATE 5 MG/1
5 TABLET ORAL DAILY
Qty: 90 TABLET | Refills: 0 | Status: SHIPPED | OUTPATIENT
Start: 2025-04-17

## 2025-04-18 ENCOUNTER — HOSPITAL ENCOUNTER (OUTPATIENT)
Dept: CT IMAGING | Age: 80
Discharge: HOME | End: 2025-04-18
Attending: SURGERY

## 2025-04-18 ENCOUNTER — HOSPITAL ENCOUNTER (OUTPATIENT)
Dept: CT IMAGING | Age: 80
End: 2025-04-18
Attending: SURGERY

## 2025-04-18 VITALS
DIASTOLIC BLOOD PRESSURE: 79 MMHG | HEIGHT: 75 IN | RESPIRATION RATE: 16 BRPM | BODY MASS INDEX: 26.73 KG/M2 | HEART RATE: 65 BPM | WEIGHT: 215 LBS | SYSTOLIC BLOOD PRESSURE: 159 MMHG | OXYGEN SATURATION: 95 % | TEMPERATURE: 97.9 F

## 2025-04-18 DIAGNOSIS — N28.89 LEFT RENAL MASS: ICD-10-CM

## 2025-04-18 RX ORDER — SODIUM CHLORIDE 9 MG/ML
INJECTION, SOLUTION INTRAVENOUS CONTINUOUS
Status: DISCONTINUED | OUTPATIENT
Start: 2025-04-18 | End: 2025-04-20 | Stop reason: HOSPADM

## 2025-04-18 ASSESSMENT — PAIN SCALES - GENERAL: PAINLEVEL_OUTOF10: 0

## 2025-04-21 ENCOUNTER — TELEPHONE (OUTPATIENT)
Dept: GENERAL RADIOLOGY | Age: 80
End: 2025-04-21

## 2025-04-28 ASSESSMENT — PATIENT HEALTH QUESTIONNAIRE - PHQ9
1. LITTLE INTEREST OR PLEASURE IN DOING THINGS: NOT AT ALL
SUM OF ALL RESPONSES TO PHQ9 QUESTIONS 1 AND 2: 0
2. FEELING DOWN, DEPRESSED OR HOPELESS: NOT AT ALL
CLINICAL INTERPRETATION OF PHQ2 SCORE: NO FURTHER SCREENING NEEDED
CLINICAL INTERPRETATION OF PHQ2 SCORE: NO FURTHER SCREENING NEEDED
CLINICAL INTERPRETATION OF PHQ2 SCORE: 0
SUM OF ALL RESPONSES TO PHQ9 QUESTIONS 1 AND 2: 0

## 2025-04-29 ENCOUNTER — TELEPHONE (OUTPATIENT)
Dept: GENERAL RADIOLOGY | Age: 80
End: 2025-04-29

## 2025-04-30 ENCOUNTER — HOSPITAL ENCOUNTER (OUTPATIENT)
Dept: CT IMAGING | Age: 80
Discharge: HOME | End: 2025-04-30
Attending: SURGERY

## 2025-04-30 VITALS
TEMPERATURE: 99 F | HEART RATE: 60 BPM | DIASTOLIC BLOOD PRESSURE: 78 MMHG | OXYGEN SATURATION: 94 % | BODY MASS INDEX: 26.11 KG/M2 | HEIGHT: 75 IN | WEIGHT: 210 LBS | SYSTOLIC BLOOD PRESSURE: 141 MMHG | RESPIRATION RATE: 18 BRPM

## 2025-04-30 LAB
BASOPHILS # BLD: 0 K/MCL (ref 0–0.3)
BASOPHILS NFR BLD: 1 %
DEPRECATED RDW RBC: 40.2 FL (ref 39–50)
EOSINOPHIL # BLD: 0 K/MCL (ref 0–0.5)
EOSINOPHIL NFR BLD: 0 %
ERYTHROCYTE [DISTWIDTH] IN BLOOD: 13 % (ref 11–15)
HCT VFR BLD CALC: 43.6 % (ref 39–51)
HGB BLD-MCNC: 14.8 G/DL (ref 13–17)
IMM GRANULOCYTES # BLD AUTO: 0 K/MCL (ref 0–0.2)
IMM GRANULOCYTES # BLD: 0 %
INR PPP: 1.1
LYMPHOCYTES # BLD: 1.7 K/MCL (ref 1–4)
LYMPHOCYTES NFR BLD: 37 %
MCH RBC QN AUTO: 29.1 PG (ref 26–34)
MCHC RBC AUTO-ENTMCNC: 33.9 G/DL (ref 32–36.5)
MCV RBC AUTO: 85.7 FL (ref 78–100)
MONOCYTES # BLD: 0.5 K/MCL (ref 0.3–0.9)
MONOCYTES NFR BLD: 12 %
NEUTROPHILS # BLD: 2.3 K/MCL (ref 1.8–7.7)
NEUTROPHILS NFR BLD: 50 %
NRBC BLD MANUAL-RTO: 0 /100 WBC
PLATELET # BLD AUTO: 210 K/MCL (ref 140–450)
PROTHROMBIN TIME: 11.5 SEC (ref 9.7–11.8)
RBC # BLD: 5.09 MIL/MCL (ref 4.5–5.9)
WBC # BLD: 4.6 K/MCL (ref 4.2–11)

## 2025-04-30 PROCEDURE — 10002800 HB RX 250 W HCPCS: Performed by: RADIOLOGY

## 2025-04-30 PROCEDURE — 10002807 HB RX 258: Performed by: RADIOLOGY

## 2025-04-30 PROCEDURE — 85025 COMPLETE CBC W/AUTO DIFF WBC: CPT | Performed by: RADIOLOGY

## 2025-04-30 PROCEDURE — 88173 CYTOPATH EVAL FNA REPORT: CPT | Performed by: SURGERY

## 2025-04-30 PROCEDURE — 50200 RENAL BIOPSY PERQ: CPT

## 2025-04-30 PROCEDURE — 85610 PROTHROMBIN TIME: CPT | Performed by: SURGERY

## 2025-04-30 RX ORDER — MIDAZOLAM HYDROCHLORIDE 1 MG/ML
INJECTION, SOLUTION INTRAMUSCULAR; INTRAVENOUS
Status: DISCONTINUED
Start: 2025-04-30 | End: 2025-05-01 | Stop reason: HOSPADM

## 2025-04-30 RX ORDER — MIDAZOLAM HYDROCHLORIDE 1 MG/ML
INJECTION, SOLUTION INTRAMUSCULAR; INTRAVENOUS PRN
Status: COMPLETED | OUTPATIENT
Start: 2025-04-30 | End: 2025-04-30

## 2025-04-30 RX ORDER — SODIUM CHLORIDE 9 MG/ML
INJECTION, SOLUTION INTRAVENOUS ONCE
Status: COMPLETED | OUTPATIENT
Start: 2025-04-30 | End: 2025-04-30

## 2025-04-30 RX ADMIN — MIDAZOLAM HYDROCHLORIDE 1 MG: 1 INJECTION, SOLUTION INTRAMUSCULAR; INTRAVENOUS at 13:17

## 2025-04-30 RX ADMIN — MIDAZOLAM HYDROCHLORIDE 1 MG: 1 INJECTION, SOLUTION INTRAMUSCULAR; INTRAVENOUS at 13:09

## 2025-04-30 RX ADMIN — SODIUM CHLORIDE: 9 INJECTION, SOLUTION INTRAVENOUS at 11:23

## 2025-04-30 RX ADMIN — FENTANYL CITRATE 50 MCG: 50 INJECTION INTRAMUSCULAR; INTRAVENOUS at 13:08

## 2025-04-30 RX ADMIN — FENTANYL CITRATE 50 MCG: 50 INJECTION INTRAMUSCULAR; INTRAVENOUS at 13:16

## 2025-04-30 ASSESSMENT — ACTIVITIES OF DAILY LIVING (ADL)
ADL_SHORT_OF_BREATH: NO
RECENT_DECLINE_ADL: NO
ADL_SCORE: 12
ADL_BEFORE_ADMISSION: INDEPENDENT

## 2025-04-30 ASSESSMENT — PAIN SCALES - GENERAL
PAINLEVEL_OUTOF10: 0

## 2025-05-02 ENCOUNTER — CLINICAL ABSTRACT (OUTPATIENT)
Dept: HEALTH INFORMATION MANAGEMENT | Facility: OTHER | Age: 80
End: 2025-05-02

## 2025-05-02 LAB
ASR DISCLAIMER: NORMAL
CASE RPRT: NORMAL
CLINICAL INFO: NORMAL
PATH REPORT.FINAL DX SPEC: NORMAL
PATH REPORT.FINAL DX SPEC: NORMAL
PATH REPORT.GROSS SPEC: NORMAL
PATH REPORT.INTRAOP OBS SPEC DOC: NORMAL

## 2025-05-02 ASSESSMENT — PATIENT HEALTH QUESTIONNAIRE - PHQ9
CLINICAL INTERPRETATION OF PHQ2 SCORE: 0
2. FEELING DOWN, DEPRESSED OR HOPELESS: NOT AT ALL
1. LITTLE INTEREST OR PLEASURE IN DOING THINGS: NOT AT ALL

## 2025-05-03 ASSESSMENT — PATIENT HEALTH QUESTIONNAIRE - PHQ9
CLINICAL INTERPRETATION OF PHQ2 SCORE: NO FURTHER SCREENING NEEDED
SUM OF ALL RESPONSES TO PHQ9 QUESTIONS 1 AND 2: 0
CLINICAL INTERPRETATION OF PHQ2 SCORE: 0
2. FEELING DOWN, DEPRESSED OR HOPELESS: NOT AT ALL
1. LITTLE INTEREST OR PLEASURE IN DOING THINGS: NOT AT ALL

## 2025-05-05 ENCOUNTER — OFFICE VISIT (OUTPATIENT)
Dept: FAMILY MEDICINE | Age: 80
End: 2025-05-05

## 2025-05-05 ENCOUNTER — LAB SERVICES (OUTPATIENT)
Dept: LAB | Age: 80
End: 2025-05-05

## 2025-05-05 ENCOUNTER — APPOINTMENT (OUTPATIENT)
Dept: FAMILY MEDICINE | Age: 80
End: 2025-05-05

## 2025-05-05 VITALS
SYSTOLIC BLOOD PRESSURE: 134 MMHG | TEMPERATURE: 96.6 F | OXYGEN SATURATION: 98 % | RESPIRATION RATE: 18 BRPM | BODY MASS INDEX: 26.24 KG/M2 | DIASTOLIC BLOOD PRESSURE: 76 MMHG | HEART RATE: 89 BPM | HEIGHT: 75 IN | WEIGHT: 211 LBS

## 2025-05-05 DIAGNOSIS — R21 SKIN RASH: ICD-10-CM

## 2025-05-05 DIAGNOSIS — I10 PRIMARY HYPERTENSION: ICD-10-CM

## 2025-05-05 DIAGNOSIS — Z23 NEED FOR VACCINATION: Primary | ICD-10-CM

## 2025-05-05 DIAGNOSIS — J30.2 SEASONAL ALLERGIES: ICD-10-CM

## 2025-05-05 DIAGNOSIS — Z00.00 ENCOUNTER FOR MEDICARE ANNUAL WELLNESS EXAM: ICD-10-CM

## 2025-05-05 DIAGNOSIS — D30.02 RENAL ONCOCYTOMA, LEFT: ICD-10-CM

## 2025-05-05 RX ORDER — TRIAMCINOLONE ACETONIDE 1 MG/G
1 OINTMENT TOPICAL PRN
Qty: 30 G | Refills: 1 | Status: SHIPPED | OUTPATIENT
Start: 2025-05-05

## 2025-05-05 ASSESSMENT — ENCOUNTER SYMPTOMS
RESPIRATORY NEGATIVE: 1
GASTROINTESTINAL NEGATIVE: 1
CONSTITUTIONAL NEGATIVE: 1
PSYCHIATRIC NEGATIVE: 1
ENDOCRINE NEGATIVE: 1
NEUROLOGICAL NEGATIVE: 1
HEMATOLOGIC/LYMPHATIC NEGATIVE: 1

## 2025-05-05 ASSESSMENT — MINI COG
PATIENT ABLE TO FILL IN THE CLOCK FACE WITH 10 MINUTES PAST 11 O'CLOCK?: YES, CLOCK IS CORRECT
PATIENT WAS GIVEN REPEAT BACK WORDS FROM VERSION: 1 - BANANA SUNRISE CHAIR

## 2025-05-05 ASSESSMENT — PAIN SCALES - GENERAL: PAINLEVEL_OUTOF10: 0

## 2025-05-06 ENCOUNTER — RESULTS FOLLOW-UP (OUTPATIENT)
Dept: FAMILY MEDICINE | Age: 80
End: 2025-05-06

## 2025-05-06 LAB
CREAT UR-MCNC: 135 MG/DL
MICROALBUMIN UR-MCNC: 0.78 MG/DL
MICROALBUMIN/CREAT UR: 5.8 MG/G

## 2025-05-19 DIAGNOSIS — D41.01 NEOPLASM OF UNCERTAIN BEHAVIOR OF RIGHT KIDNEY: ICD-10-CM

## 2025-05-19 DIAGNOSIS — D41.20 NEOPLASM OF UNCERTAIN BEHAVIOR OF KIDNEY AND URETER: Primary | ICD-10-CM

## 2025-05-19 DIAGNOSIS — D41.00 NEOPLASM OF UNCERTAIN BEHAVIOR OF KIDNEY AND URETER: Primary | ICD-10-CM

## 2025-06-05 DIAGNOSIS — J30.9 ALLERGIC RHINITIS, UNSPECIFIED SEASONALITY, UNSPECIFIED TRIGGER: ICD-10-CM

## 2025-06-07 ENCOUNTER — APPOINTMENT (OUTPATIENT)
Dept: FAMILY MEDICINE | Age: 80
End: 2025-06-07

## 2025-06-07 DIAGNOSIS — I10 PRIMARY HYPERTENSION: ICD-10-CM

## 2025-06-09 RX ORDER — TRIAMTERENE AND HYDROCHLOROTHIAZIDE 37.5; 25 MG/1; MG/1
1 TABLET ORAL DAILY
Qty: 90 TABLET | Refills: 0 | Status: SHIPPED | OUTPATIENT
Start: 2025-06-09

## 2025-06-09 RX ORDER — MONTELUKAST SODIUM 10 MG/1
10 TABLET ORAL DAILY
Qty: 90 TABLET | Refills: 0 | Status: SHIPPED | OUTPATIENT
Start: 2025-06-09 | End: 2027-12-05

## 2025-06-23 DIAGNOSIS — N52.9 ERECTILE DYSFUNCTION, UNSPECIFIED ERECTILE DYSFUNCTION TYPE: ICD-10-CM

## 2025-06-23 RX ORDER — TADALAFIL 5 MG/1
5 TABLET ORAL DAILY
Qty: 90 TABLET | Refills: 0 | Status: SHIPPED | OUTPATIENT
Start: 2025-06-23

## 2025-07-13 DIAGNOSIS — I10 PRIMARY HYPERTENSION: ICD-10-CM

## 2025-07-14 RX ORDER — AMLODIPINE BESYLATE 5 MG/1
5 TABLET ORAL DAILY
Qty: 90 TABLET | Refills: 0 | Status: SHIPPED | OUTPATIENT
Start: 2025-07-14

## 2025-08-06 ENCOUNTER — APPOINTMENT (OUTPATIENT)
Dept: FAMILY MEDICINE | Age: 80
End: 2025-08-06

## 2025-08-06 VITALS
BODY MASS INDEX: 25.91 KG/M2 | DIASTOLIC BLOOD PRESSURE: 70 MMHG | OXYGEN SATURATION: 97 % | SYSTOLIC BLOOD PRESSURE: 136 MMHG | HEIGHT: 75 IN | TEMPERATURE: 97.8 F | RESPIRATION RATE: 18 BRPM | WEIGHT: 208.4 LBS | HEART RATE: 58 BPM

## 2025-08-06 DIAGNOSIS — I10 PRIMARY HYPERTENSION: Primary | ICD-10-CM

## 2025-08-06 DIAGNOSIS — D30.02 RENAL ONCOCYTOMA, LEFT: ICD-10-CM

## 2025-08-06 DIAGNOSIS — J30.2 SEASONAL ALLERGIES: ICD-10-CM

## 2025-08-06 ASSESSMENT — ENCOUNTER SYMPTOMS
PSYCHIATRIC NEGATIVE: 1
NEUROLOGICAL NEGATIVE: 1
RESPIRATORY NEGATIVE: 1
ENDOCRINE NEGATIVE: 1
GASTROINTESTINAL NEGATIVE: 1
HEMATOLOGIC/LYMPHATIC NEGATIVE: 1
CONSTITUTIONAL NEGATIVE: 1

## 2025-08-06 ASSESSMENT — PATIENT HEALTH QUESTIONNAIRE - PHQ9
2. FEELING DOWN, DEPRESSED OR HOPELESS: NOT AT ALL
SUM OF ALL RESPONSES TO PHQ9 QUESTIONS 1 AND 2: 0
SUM OF ALL RESPONSES TO PHQ9 QUESTIONS 1 AND 2: 0
1. LITTLE INTEREST OR PLEASURE IN DOING THINGS: NOT AT ALL
CLINICAL INTERPRETATION OF PHQ2 SCORE: NO FURTHER SCREENING NEEDED

## 2025-08-06 ASSESSMENT — PAIN SCALES - GENERAL: PAINLEVEL_OUTOF10: 0

## 2025-09-03 DIAGNOSIS — J30.9 ALLERGIC RHINITIS, UNSPECIFIED SEASONALITY, UNSPECIFIED TRIGGER: ICD-10-CM

## 2025-09-03 DIAGNOSIS — I10 PRIMARY HYPERTENSION: ICD-10-CM

## 2025-09-03 RX ORDER — MONTELUKAST SODIUM 10 MG/1
10 TABLET ORAL DAILY
Qty: 90 TABLET | Refills: 0 | Status: SHIPPED | OUTPATIENT
Start: 2025-09-03 | End: 2028-02-29

## 2025-09-03 RX ORDER — TRIAMTERENE AND HYDROCHLOROTHIAZIDE 37.5; 25 MG/1; MG/1
1 TABLET ORAL DAILY
Qty: 90 TABLET | Refills: 0 | Status: SHIPPED | OUTPATIENT
Start: 2025-09-03

## 2025-11-03 ENCOUNTER — APPOINTMENT (OUTPATIENT)
Dept: FAMILY MEDICINE | Age: 80
End: 2025-11-03

## (undated) DEVICE — Device

## (undated) DEVICE — LAWSON - PACK DAVINCI SSH

## (undated) NOTE — MR AVS SNAPSHOT
[address]  [address]  [phone]               Thank you for choosing us for your health care visit with Penny Flores DPM.  We are glad to serve you and happy to provide yo FLUAD 0.5 ML Yumi   Generic drug:  Influenza Vac A&B Surf Ant Adj   ADM 0.5ML IM UTD           Fluticasone Propionate 50 MCG/ACT Susp   Commonly known as:  FLONASE           Montelukast Sodium 10 MG Tabs   Take 10 mg by mouth once daily.    Commonly kno Choose whole grain products Foods high in sodium   Water is best for hydration Fast food.    Eat at home when possible     Tips for increasing your physical activity – Adults who are physically active are less likely to develop some chronic diseases than ad

## (undated) NOTE — MR AVS SNAPSHOT
[address]  [address]  [phone]               Thank you for choosing us for your health care visit with Jessica Bucio DPM.  We are glad to serve you and happy to provide yo Generic drug:  Influenza Vac A&B Surf Ant Adj   ADM 0.5ML IM UTD           Fluticasone Propionate 50 MCG/ACT Susp   Commonly known as:  FLONASE           Montelukast Sodium 10 MG Tabs   Take 10 mg by mouth once daily.    Commonly known as:  SINGULAIR